# Patient Record
Sex: FEMALE | Race: OTHER | NOT HISPANIC OR LATINO
[De-identification: names, ages, dates, MRNs, and addresses within clinical notes are randomized per-mention and may not be internally consistent; named-entity substitution may affect disease eponyms.]

---

## 2017-02-28 ENCOUNTER — FORM ENCOUNTER (OUTPATIENT)
Age: 57
End: 2017-02-28

## 2017-03-01 ENCOUNTER — OUTPATIENT (OUTPATIENT)
Dept: OUTPATIENT SERVICES | Facility: HOSPITAL | Age: 57
LOS: 1 days | End: 2017-03-01
Payer: MEDICARE

## 2017-03-01 ENCOUNTER — APPOINTMENT (OUTPATIENT)
Dept: ORTHOPEDIC SURGERY | Facility: CLINIC | Age: 57
End: 2017-03-01

## 2017-03-01 VITALS
HEIGHT: 68 IN | SYSTOLIC BLOOD PRESSURE: 120 MMHG | BODY MASS INDEX: 37.13 KG/M2 | DIASTOLIC BLOOD PRESSURE: 80 MMHG | WEIGHT: 245 LBS

## 2017-03-01 DIAGNOSIS — M75.101 UNSPECIFIED ROTATOR CUFF TEAR OR RUPTURE OF RIGHT SHOULDER, NOT SPECIFIED AS TRAUMATIC: ICD-10-CM

## 2017-03-01 PROCEDURE — 73030 X-RAY EXAM OF SHOULDER: CPT

## 2017-03-01 PROCEDURE — 73030 X-RAY EXAM OF SHOULDER: CPT | Mod: 26,RT

## 2017-03-08 ENCOUNTER — APPOINTMENT (OUTPATIENT)
Dept: ORTHOPEDIC SURGERY | Facility: CLINIC | Age: 57
End: 2017-03-08

## 2017-03-29 ENCOUNTER — APPOINTMENT (OUTPATIENT)
Dept: ORTHOPEDIC SURGERY | Facility: CLINIC | Age: 57
End: 2017-03-29

## 2017-03-29 ENCOUNTER — OUTPATIENT (OUTPATIENT)
Dept: OUTPATIENT SERVICES | Facility: HOSPITAL | Age: 57
LOS: 1 days | End: 2017-03-29
Payer: MEDICARE

## 2017-03-29 VITALS
HEIGHT: 68 IN | SYSTOLIC BLOOD PRESSURE: 110 MMHG | WEIGHT: 250 LBS | BODY MASS INDEX: 37.89 KG/M2 | DIASTOLIC BLOOD PRESSURE: 86 MMHG

## 2017-03-29 DIAGNOSIS — M54.16 RADICULOPATHY, LUMBAR REGION: ICD-10-CM

## 2017-03-29 PROCEDURE — 72050 X-RAY EXAM NECK SPINE 4/5VWS: CPT | Mod: 26

## 2017-03-29 PROCEDURE — 72050 X-RAY EXAM NECK SPINE 4/5VWS: CPT

## 2017-04-03 ENCOUNTER — APPOINTMENT (OUTPATIENT)
Dept: ORTHOPEDIC SURGERY | Facility: CLINIC | Age: 57
End: 2017-04-03

## 2017-04-03 DIAGNOSIS — M75.51 BURSITIS OF RIGHT SHOULDER: ICD-10-CM

## 2017-04-03 DIAGNOSIS — M19.011 PRIMARY OSTEOARTHRITIS, RIGHT SHOULDER: ICD-10-CM

## 2017-04-03 DIAGNOSIS — M75.81 OTHER SHOULDER LESIONS, RIGHT SHOULDER: ICD-10-CM

## 2017-04-11 ENCOUNTER — APPOINTMENT (OUTPATIENT)
Dept: ORTHOPEDIC SURGERY | Facility: CLINIC | Age: 57
End: 2017-04-11

## 2017-04-11 DIAGNOSIS — G56.21 LESION OF ULNAR NERVE, RIGHT UPPER LIMB: ICD-10-CM

## 2017-04-28 PROBLEM — M19.011 OSTEOARTHRITIS OF RIGHT GLENOHUMERAL JOINT: Status: ACTIVE | Noted: 2017-04-28

## 2017-06-13 ENCOUNTER — OUTPATIENT (OUTPATIENT)
Dept: OUTPATIENT SERVICES | Facility: HOSPITAL | Age: 57
LOS: 1 days | End: 2017-06-13
Payer: MEDICARE

## 2017-06-13 PROCEDURE — 73564 X-RAY EXAM KNEE 4 OR MORE: CPT | Mod: 26,50

## 2017-06-13 PROCEDURE — 73564 X-RAY EXAM KNEE 4 OR MORE: CPT

## 2017-06-13 PROCEDURE — 73521 X-RAY EXAM HIPS BI 2 VIEWS: CPT

## 2017-06-13 PROCEDURE — 73521 X-RAY EXAM HIPS BI 2 VIEWS: CPT | Mod: 26

## 2017-06-23 ENCOUNTER — APPOINTMENT (OUTPATIENT)
Dept: OPHTHALMOLOGY | Facility: CLINIC | Age: 57
End: 2017-06-23

## 2017-09-28 ENCOUNTER — APPOINTMENT (OUTPATIENT)
Dept: OPHTHALMOLOGY | Facility: CLINIC | Age: 57
End: 2017-09-28
Payer: MEDICARE

## 2017-09-28 DIAGNOSIS — H40.003 PREGLAUCOMA, UNSPECIFIED, BILATERAL: ICD-10-CM

## 2017-09-28 PROCEDURE — 92020 GONIOSCOPY: CPT

## 2017-09-28 PROCEDURE — 99203 OFFICE O/P NEW LOW 30 MIN: CPT

## 2018-01-08 ENCOUNTER — APPOINTMENT (OUTPATIENT)
Dept: OPHTHALMOLOGY | Facility: CLINIC | Age: 58
End: 2018-01-08
Payer: MEDICARE

## 2018-01-08 ENCOUNTER — OUTPATIENT (OUTPATIENT)
Dept: OUTPATIENT SERVICES | Facility: HOSPITAL | Age: 58
LOS: 1 days | End: 2018-01-08

## 2018-01-08 ENCOUNTER — APPOINTMENT (OUTPATIENT)
Dept: OPHTHALMOLOGY | Facility: CLINIC | Age: 58
End: 2018-01-08

## 2018-01-08 PROCEDURE — 66761 REVISION OF IRIS: CPT | Mod: LT

## 2018-01-09 DIAGNOSIS — H40.2230 CHRONIC ANGLE-CLOSURE GLAUCOMA, BILATERAL, STAGE UNSPECIFIED: ICD-10-CM

## 2018-01-22 ENCOUNTER — OUTPATIENT (OUTPATIENT)
Dept: OUTPATIENT SERVICES | Facility: HOSPITAL | Age: 58
LOS: 1 days | End: 2018-01-22

## 2018-01-22 ENCOUNTER — APPOINTMENT (OUTPATIENT)
Dept: OPHTHALMOLOGY | Facility: CLINIC | Age: 58
End: 2018-01-22
Payer: MEDICARE

## 2018-01-22 PROCEDURE — 66761 REVISION OF IRIS: CPT | Mod: RT

## 2018-01-23 DIAGNOSIS — H40.2230 CHRONIC ANGLE-CLOSURE GLAUCOMA, BILATERAL, STAGE UNSPECIFIED: ICD-10-CM

## 2018-01-25 ENCOUNTER — APPOINTMENT (OUTPATIENT)
Dept: OPHTHALMOLOGY | Facility: CLINIC | Age: 58
End: 2018-01-25
Payer: MEDICARE

## 2018-01-25 PROCEDURE — 99024 POSTOP FOLLOW-UP VISIT: CPT

## 2018-02-15 ENCOUNTER — APPOINTMENT (OUTPATIENT)
Dept: OPHTHALMOLOGY | Facility: CLINIC | Age: 58
End: 2018-02-15
Payer: MEDICARE

## 2018-02-15 PROCEDURE — 99212 OFFICE O/P EST SF 10 MIN: CPT

## 2018-02-15 PROCEDURE — 92020 GONIOSCOPY: CPT

## 2018-04-13 ENCOUNTER — APPOINTMENT (OUTPATIENT)
Dept: OPHTHALMOLOGY | Facility: CLINIC | Age: 58
End: 2018-04-13
Payer: MEDICARE

## 2018-04-13 PROCEDURE — 92133 CPTRZD OPH DX IMG PST SGM ON: CPT

## 2018-04-13 PROCEDURE — 92083 EXTENDED VISUAL FIELD XM: CPT

## 2018-04-13 PROCEDURE — 92014 COMPRE OPH EXAM EST PT 1/>: CPT

## 2018-05-31 ENCOUNTER — RX RENEWAL (OUTPATIENT)
Age: 58
End: 2018-05-31

## 2018-07-31 ENCOUNTER — RX RENEWAL (OUTPATIENT)
Age: 58
End: 2018-07-31

## 2018-07-31 RX ORDER — TIMOLOL MALEATE 2.5 MG/ML
0.25 SOLUTION/ DROPS OPHTHALMIC
Qty: 1 | Refills: 3 | Status: ACTIVE | COMMUNITY
Start: 2018-07-31 | End: 1900-01-01

## 2018-08-16 ENCOUNTER — APPOINTMENT (OUTPATIENT)
Dept: OPHTHALMOLOGY | Facility: CLINIC | Age: 58
End: 2018-08-16
Payer: MEDICARE

## 2018-08-16 PROCEDURE — 92014 COMPRE OPH EXAM EST PT 1/>: CPT

## 2018-12-13 ENCOUNTER — APPOINTMENT (OUTPATIENT)
Dept: OPHTHALMOLOGY | Facility: CLINIC | Age: 58
End: 2018-12-13

## 2019-01-18 ENCOUNTER — APPOINTMENT (OUTPATIENT)
Dept: OPHTHALMOLOGY | Facility: CLINIC | Age: 59
End: 2019-01-18
Payer: MEDICARE

## 2019-01-18 PROCEDURE — 92014 COMPRE OPH EXAM EST PT 1/>: CPT

## 2019-01-22 ENCOUNTER — APPOINTMENT (OUTPATIENT)
Dept: ORTHOPEDIC SURGERY | Facility: CLINIC | Age: 59
End: 2019-01-22
Payer: MEDICARE

## 2019-01-22 VITALS — WEIGHT: 228 LBS | HEIGHT: 68 IN | BODY MASS INDEX: 34.56 KG/M2

## 2019-01-22 DIAGNOSIS — M67.912 UNSPECIFIED DISORDER OF SYNOVIUM AND TENDON, LEFT SHOULDER: ICD-10-CM

## 2019-01-22 DIAGNOSIS — M16.10 UNILATERAL PRIMARY OSTEOARTHRITIS, UNSPECIFIED HIP: ICD-10-CM

## 2019-01-22 DIAGNOSIS — G89.29 PAIN IN RIGHT WRIST: ICD-10-CM

## 2019-01-22 DIAGNOSIS — M25.531 PAIN IN RIGHT WRIST: ICD-10-CM

## 2019-01-22 DIAGNOSIS — Z56.0 UNEMPLOYMENT, UNSPECIFIED: ICD-10-CM

## 2019-01-22 DIAGNOSIS — Z87.39 PERSONAL HISTORY OF OTHER DISEASES OF THE MUSCULOSKELETAL SYSTEM AND CONNECTIVE TISSUE: ICD-10-CM

## 2019-01-22 DIAGNOSIS — Z82.62 FAMILY HISTORY OF OSTEOPOROSIS: ICD-10-CM

## 2019-01-22 DIAGNOSIS — M17.10 UNILATERAL PRIMARY OSTEOARTHRITIS, UNSPECIFIED KNEE: ICD-10-CM

## 2019-01-22 DIAGNOSIS — Z86.79 PERSONAL HISTORY OF OTHER DISEASES OF THE CIRCULATORY SYSTEM: ICD-10-CM

## 2019-01-22 DIAGNOSIS — Z80.8 FAMILY HISTORY OF MALIGNANT NEOPLASM OF OTHER ORGANS OR SYSTEMS: ICD-10-CM

## 2019-01-22 DIAGNOSIS — Z86.718 PERSONAL HISTORY OF OTHER VENOUS THROMBOSIS AND EMBOLISM: ICD-10-CM

## 2019-01-22 DIAGNOSIS — Z78.9 OTHER SPECIFIED HEALTH STATUS: ICD-10-CM

## 2019-01-22 DIAGNOSIS — M65.4 RADIAL STYLOID TENOSYNOVITIS [DE QUERVAIN]: ICD-10-CM

## 2019-01-22 PROCEDURE — 99215 OFFICE O/P EST HI 40 MIN: CPT | Mod: 25

## 2019-01-22 PROCEDURE — 20610 DRAIN/INJ JOINT/BURSA W/O US: CPT | Mod: LT

## 2019-01-22 PROCEDURE — 73100 X-RAY EXAM OF WRIST: CPT | Mod: RT

## 2019-01-22 PROCEDURE — 73030 X-RAY EXAM OF SHOULDER: CPT | Mod: LT

## 2019-01-22 SDOH — ECONOMIC STABILITY - INCOME SECURITY: UNEMPLOYMENT, UNSPECIFIED: Z56.0

## 2019-01-22 NOTE — PROCEDURE
[Injection] : Injection [Left] : of the left [Subacromial Bursa] : subacromial bursa [Inflammation] : inflammation [Joint Pain] : joint pain [Bleeding] : bleeding [Infection] : infection [Allergic Reaction] : allergic reaction [Patient] : patient [Risk] : risk [Benefits] : benefits [Alternatives] : alternatives [Verbal Consent Obtained] : verbal consent was obtained prior to the procedure [Alcohol] : alcohol [Betadine] : betadine [Ethyl Chloride Spray] : ethyl chloride spray was used as a topical anesthetic [Posterior] : posterior [Same Needle As Aspiration/New Syringe] : the syringe was changed and the same needle was left in place and [1% Lidocaine___(mL)] : [unfilled] mL of 1% Lidocaine [Methylpred. 40mg/mL___(mL)] : [unfilled] mL 40mg/mL methylprednisolone [Bandage Applied] : a bandage [Tolerated Well] : The patient tolerated the procedure well [None] : none [No Strenuous Activity___day(s)] : avoid strenuous activity for [unfilled] day(s) [___ Week(s)] : in [unfilled] week(s)

## 2019-01-22 NOTE — REVIEW OF SYSTEMS
[Joint Pain] : joint pain [Negative] : Heme/Lymph [Joint Stiffness] : joint stiffness [Eye Pain] : eye pain

## 2019-01-29 ENCOUNTER — RX RENEWAL (OUTPATIENT)
Age: 59
End: 2019-01-29

## 2019-01-29 DIAGNOSIS — F40.240 CLAUSTROPHOBIA: ICD-10-CM

## 2019-02-04 PROBLEM — M54.12 CERVICAL RADICULOPATHY: Status: ACTIVE | Noted: 2017-03-29

## 2019-02-05 ENCOUNTER — APPOINTMENT (OUTPATIENT)
Dept: ORTHOPEDIC SURGERY | Facility: CLINIC | Age: 59
End: 2019-02-05
Payer: MEDICARE

## 2019-02-05 DIAGNOSIS — M54.12 RADICULOPATHY, CERVICAL REGION: ICD-10-CM

## 2019-02-05 PROCEDURE — 99214 OFFICE O/P EST MOD 30 MIN: CPT | Mod: 25

## 2019-02-05 PROCEDURE — 20552 NJX 1/MLT TRIGGER POINT 1/2: CPT | Mod: RT

## 2019-02-05 NOTE — ASSESSMENT
[FreeTextEntry1] : 57 yo with Left shoulder osteoarthritis and cervical spondylosis\par  . There is keep pain and inflammation in the LEFT shoulder. MRI showed more arthritis than appreciated by x-ray and there is also signs of inflammation. I referred her for some blood tests to rule out rheumatoid arthritis or other inflammatory arthritis.\par She was having significant pain in the RIGHT rhomboid so we did a trigger point injection there today which seemed to give her some immediate relief from the lidocaine. Hopefully it will give her more long-term relief. I prescribed an MRI of the cervical spine because she is clearly having significant pain from the cervical spine and seemed to have some neurologic symptoms in the LEFT hand which could be cervical radiculopathy. The shoulder pain may be in part related referred from the cervical spine but based on exam I believe is also significantly from the shoulder itself.\par I will call her with the results of the blood tests and the MRI and see how she is feeling. She'll start physical therapy. Continue meloxicam and muscle relaxants and lidocaine patch and Tylenol as needed

## 2019-02-05 NOTE — HISTORY OF PRESENT ILLNESS
[de-identified] : Left shoulder is still very painful. \par The cortisone injection last visit helped for about 3 days and then the pain came back. She is having a lot of pain lifting her LEFT arm.\par Pain today is more severe around the RIGHT mid medial scapular region. She does have pain with neck range of motion.\par She was referred to physical therapy but hasn't started yet.\par She is using a lidocaine patch and she has been taking the Mobic 15 mg q.d. She was also using a muscle relaxants and would like to try more of that.\par She has pain both at rest but is intense in the scapular region and the shoulder pain is worse with movement. She is also getting numbness in the ulnar fingers

## 2019-02-06 LAB
BASOPHILS # BLD AUTO: 0.03 K/UL
BASOPHILS NFR BLD AUTO: 0.3 %
EOSINOPHIL # BLD AUTO: 0.28 K/UL
EOSINOPHIL NFR BLD AUTO: 2.6 %
ERYTHROCYTE [SEDIMENTATION RATE] IN BLOOD BY WESTERGREN METHOD: 9 MM/HR
HCT VFR BLD CALC: 42.3 %
HGB BLD-MCNC: 13.3 G/DL
IMM GRANULOCYTES NFR BLD AUTO: 0.2 %
LYMPHOCYTES # BLD AUTO: 3.04 K/UL
LYMPHOCYTES NFR BLD AUTO: 28.2 %
MAN DIFF?: NORMAL
MCHC RBC-ENTMCNC: 31.4 GM/DL
MCHC RBC-ENTMCNC: 31.6 PG
MCV RBC AUTO: 100.5 FL
MONOCYTES # BLD AUTO: 0.5 K/UL
MONOCYTES NFR BLD AUTO: 4.6 %
NEUTROPHILS # BLD AUTO: 6.92 K/UL
NEUTROPHILS NFR BLD AUTO: 64.1 %
PLATELET # BLD AUTO: 273 K/UL
RBC # BLD: 4.21 M/UL
RBC # FLD: 13.5 %
WBC # FLD AUTO: 10.79 K/UL

## 2019-02-07 LAB
ANA SER IF-ACNC: NEGATIVE
CCP AB SER IA-ACNC: <8 UNITS
RF+CCP IGG SER-IMP: NEGATIVE
RHEUMATOID FACT SER QL: <10 IU/ML

## 2019-02-12 LAB

## 2019-02-13 LAB — CRP SERPL-MCNC: 1.06 MG/DL

## 2019-02-18 PROBLEM — M75.02 ADHESIVE CAPSULITIS OF LEFT SHOULDER: Status: ACTIVE | Noted: 2019-01-22

## 2019-02-19 ENCOUNTER — APPOINTMENT (OUTPATIENT)
Dept: ORTHOPEDIC SURGERY | Facility: CLINIC | Age: 59
End: 2019-02-19
Payer: MEDICARE

## 2019-02-19 DIAGNOSIS — M75.02 ADHESIVE CAPSULITIS OF LEFT SHOULDER: ICD-10-CM

## 2019-02-19 DIAGNOSIS — M75.42 IMPINGEMENT SYNDROME OF LEFT SHOULDER: ICD-10-CM

## 2019-02-19 PROCEDURE — 99214 OFFICE O/P EST MOD 30 MIN: CPT

## 2019-02-19 NOTE — PHYSICAL EXAM
[UE] : Sensory: Intact in bilateral upper extremities [Rad] : radial 2+ and symmetric bilaterally [Normal RUE] : Right Upper Extremity: No scars, rashes, lesions, ulcers, skin intact [Normal LUE] : Left Upper Extremity: No scars, rashes, lesions, ulcers, skin intact [Normal Touch] : sensation intact for touch [de-identified] : Shoulders, cervical spine and upper back:\par No edema, ecchymoses, erythema.\par Cervical spine range of motion is with stiffness and minimal discomfort but no particular orthopedic.\par Tender in the RIGHT rhomboid/periscapular muscles adjacent to the RIGHT thoracic spine and less tender on the LEFT.\par Tender glenohumeral joint LEFT. No appreciable effusion. No warmth.\par LEFT shoulder range of motion is with severe pain and stiffness. On the RIGHT side she can elevate her arm to 175° but on the LEFT she can only elevate to about 30 or 40° with pain. Passively I can elevate the shoulder to about 90° after which there is severe pain. External rotation is to about 65° on the LEFT.\par Good strength with internal and external rotation. Pain and weakness LEFT with elevation of the arm [de-identified] : \par No new studies.\par

## 2019-02-19 NOTE — REASON FOR VISIT
[Follow-Up Visit] : a follow-up visit for [Shoulder Pain] : shoulder pain [FreeTextEntry2] : right mid back and neck pain

## 2019-02-19 NOTE — ASSESSMENT
[FreeTextEntry1] : 57 yo with ongoing LEFT shoulder pain and stiffness where she has underlying osteoarthritis and rotator cuff tendinopathy and tendinitis. The blood test did not seem to indicate any significant rheumatoid arthritis but the C-reactive protein was slightly elevated. She also has cervical spondylosis and has radicular pain to the scapular region.Her pain is much more severe than I would expect given what we are seeing on the studies. Even with severe arthritis patients don't typically have this much pain. \par She will be seeing a neurologist in a few weeks.\par She is getting the MRI of the cervical spine as I had ordered. I will call her with the results.\par For the shoulder I did not palpate any significant effusion that I could aspirate but referred her for an ultrasound to see if there is fluid to aspirate send for crystals and culture. A corticosteroid injection could be done under ultrasound guidance as well to see if this gives her more relief than the last injection.\par Until she gets that done I suggested taking a steroid pack, prednisone over 6 days tapering from 60 mg to 10 mg to see if this gives her relief. She was very disappointed come in today because she felt like nothing was being done but I felt that we first need to try to figure out why her pain is as severe as it is in the shoulder. Once we have a clear diagnosis we can better direct treatment.  I felt the ultrasound-guided aspiration and injection would be more helpful  and more precise.\par I did prescribe Xanax for the MRI of her cervical spine but I would not prescribe chronic use. She would need to speak to her medical doctor or pain management specialist..\par From the ultrasound, MRI and neurology consultation hopefully we will have more information.\par Hopefully the prednisone gives her some relief.

## 2019-02-19 NOTE — HISTORY OF PRESENT ILLNESS
[de-identified] : Left shoulder is feeling persistently painful -- currently 8/10.  It is worse with movement. THere is tingling in the fingers LEFT greater than RIGHT hand. Some neck pain but less severe.\par She is scheduled to get MRI C spine next week and is seeing Dr Stokes from neurology 3/15.\par When she had the MRI and took the Xanax she had the most relief of pain and would like more Xanax.\par She is taking Flexeril which doesn't help, Aleve 4 /day and Tylenol #3 and flexeril patches\par She hasn't started  PT yet. No fevers or chills. The corticosteroid injection in the LEFT shoulder performed about a month ago did not help.\par She also has more pain now in the RIGHT  mid back thoracic region.

## 2019-03-04 ENCOUNTER — RX RENEWAL (OUTPATIENT)
Age: 59
End: 2019-03-04

## 2019-03-11 ENCOUNTER — APPOINTMENT (OUTPATIENT)
Dept: NEUROLOGY | Facility: CLINIC | Age: 59
End: 2019-03-11
Payer: MEDICARE

## 2019-03-11 VITALS
HEART RATE: 77 BPM | BODY MASS INDEX: 33.49 KG/M2 | OXYGEN SATURATION: 98 % | WEIGHT: 221 LBS | SYSTOLIC BLOOD PRESSURE: 114 MMHG | HEIGHT: 68 IN | TEMPERATURE: 97.9 F | DIASTOLIC BLOOD PRESSURE: 85 MMHG

## 2019-03-11 DIAGNOSIS — M47.812 SPONDYLOSIS W/OUT MYELOPATHY OR RADICULOPATHY, CERVICAL REGION: ICD-10-CM

## 2019-03-11 DIAGNOSIS — M79.18 MYALGIA, OTHER SITE: ICD-10-CM

## 2019-03-11 DIAGNOSIS — M19.012 PRIMARY OSTEOARTHRITIS, LEFT SHOULDER: ICD-10-CM

## 2019-03-11 PROCEDURE — 99204 OFFICE O/P NEW MOD 45 MIN: CPT

## 2019-03-11 NOTE — CONSULT LETTER
[Dear  ___] : Dear  [unfilled], [Consult Letter:] : I had the pleasure of evaluating your patient, [unfilled]. [Please see my note below.] : Please see my note below. [Consult Closing:] : Thank you very much for allowing me to participate in the care of this patient.  If you have any questions, please do not hesitate to contact me. [Sincerely,] : Sincerely, [FreeTextEntry3] : Den Stokes M.D.\par Neurology, Electromyography and Neuromuscular Medicine\par [facility]\par \par  of Neurology\par [facility] [employer]

## 2019-03-11 NOTE — ASSESSMENT
[FreeTextEntry1] : Unlikely neurologic cause of symptoms. Exam is unremarkable - strength and reflexes symmetric despite nonspecific sensory symptoms, given that NCS/EMG is low yield and i would not recommend at this time. \par Return as needed.
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
## 2019-03-11 NOTE — PHYSICAL EXAM
[FreeTextEntry1] : Gen: appears well, well-nourished, no acute distress\par MS: awake, alert, speech fluent, comprehension intact, follows commands\par CN: PERRL, EOMI, visual fields full, facial strength and sensation intact and symmetric, palate elevation symmetric, tongue midline, no tongue atrophy or fasciculations\par Motor: normal bulk and tone, 5/5 strength throughout, no abnormal movements\par Sensory: reports diminished vibration and PP left hand compared to right\par Reflexes: 1+ UE absent LE b/l, no Orozco’s sign\par Coordination: no dysmetria on finger to nose\par Gait: antalgic\par Skin: no rash on extremities; scars on knees b/l\par Ext: no edema\par CV: regular pulse\par Ophtho: fundi not visualized\par MSK: severe tenderness right rhomboid and left trapezius, shoulder girdle, supraclavicular area\par

## 2019-03-13 ENCOUNTER — CLINICAL ADVICE (OUTPATIENT)
Age: 59
End: 2019-03-13

## 2019-04-01 ENCOUNTER — CLINICAL ADVICE (OUTPATIENT)
Age: 59
End: 2019-04-01

## 2019-04-01 RX ORDER — PREDNISONE 10 MG/1
10 TABLET ORAL
Qty: 21 | Refills: 0 | Status: COMPLETED | COMMUNITY
Start: 2019-02-19 | End: 2019-03-07

## 2019-04-08 ENCOUNTER — RX RENEWAL (OUTPATIENT)
Age: 59
End: 2019-04-08

## 2019-04-08 RX ORDER — CYCLOBENZAPRINE HYDROCHLORIDE 5 MG/1
5 TABLET, FILM COATED ORAL
Qty: 30 | Refills: 0 | Status: ACTIVE | COMMUNITY
Start: 2019-02-05 | End: 1900-01-01

## 2019-04-08 RX ORDER — ACETAMINOPHEN AND CODEINE PHOSPHATE 60; 300 MG/1; MG/1
300-60 TABLET ORAL
Qty: 120 | Refills: 0 | Status: COMPLETED | COMMUNITY
Start: 2016-07-20 | End: 2019-04-08

## 2019-04-08 RX ORDER — MELOXICAM 15 MG/1
TABLET ORAL
Refills: 0 | Status: COMPLETED | COMMUNITY
End: 2019-04-08

## 2019-04-08 RX ORDER — ALPRAZOLAM 0.5 MG/1
0.5 TABLET ORAL
Qty: 2 | Refills: 0 | Status: COMPLETED | COMMUNITY
Start: 2019-01-29 | End: 2019-04-08

## 2019-04-08 NOTE — ASSESSMENT
[FreeTextEntry1] : 58-year-old woman who has a very complicated history with a lot going on.\par She comes in primarily for her severe LEFT shoulder and neck pain. She has cervical spondylosis and stenosis and radicular symptoms but also is having LEFT shoulder pain and limited motion.\par I referred her for an MRI of the LEFT shoulder.\par 2 try to give her some immediate pain relief I did do a subacromial corticosteroid injection. She may continue taking meloxicam and Tylenol. She can take Tylenol with codeine also as needed but I would try to minimize narcotics.\par She was referred to physical therapy for the shoulder.\par I will call her with the MRI results. She may have gone into an adhesive capsulitis given the significant loss of motion and pain. She does have some osteoarthritis but it seemed like the stiffness is more than I would have expected given the x-ray findings.\par She also is having significant numbness in both upper extremities worse on the LEFT than RIGHT that was aggravated by a posture brace that she was wearing recently. I referred her for nerve studies to try to determine where the numbness is coming from whether it cervical, cubital tunnel or other nerve impingement.\par She has RIGHT radial wrist pain consistent with de Quervain's tenosynovitis. I recommended a thumb spica splint, heat and ice and NSAIDs.\par Followup in about 3-4 weeks.
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
## 2019-04-08 NOTE — PHYSICAL EXAM
[UE] : Sensory: Intact in bilateral upper extremities [Rad] : radial 2+ and symmetric bilaterally [Normal RUE] : Right Upper Extremity: No scars, rashes, lesions, ulcers, skin intact [Normal LUE] : Left Upper Extremity: No scars, rashes, lesions, ulcers, skin intact [Normal Touch] : sensation intact for touch [Obese] : obese [Normal] : Oriented to person, place, and time, insight and judgement were intact and the affect was normal [de-identified] : Bilateral shouldesr:\par Cervical spine is mildly tender. Cervical ROM is within normal limits and without pain.\par Normal appearance. \par LEFT shoulder range of motion actively is with forward elevation to about 90° and passively to about 110° with severe pain.\par External rotation to about 40° and internal rotation to L5.\par In the RIGHT shoulder active motion is to about 145° and passively to about 165° with pain.\par 45° external rotation with the arm at the side and internal rotation to T12.\par Motor:  4/5  supraspinatus,  5-/5 ER, 5-/5 IR, 5/5 biceps, 5/5 deltoid. -\par L>R: + Neer, + Sanchez. + X-arm.   +[place]'s, + Speeds.\par Tender over the biceps tendon and the anterior shoulder.  \par Laxity: normal.\par No scapular winging.\par Sensation is intact distally in the UE Except decreased in the ulnar distribution distally in the index and small fingers and bilateral hands.\par Skin is intact in the UE. \par Intact Motor distally.\par \par RIGHT wrist:\par No edema, ecchymoses, erythema.\par Tender over the first extensor compartment happened just distal to the tip of the radial styloid.\par A positive Finkelstein's.\par Intact thumb extension and abduction.\par Intact wrist range of motion without any significant limitation or pain.\par Motor is  intact.No muscle atrophy. Decreased sensation as described above ulnar digits  [de-identified] : No respiratory distress [de-identified] : \par X-rays of the RIGHT wrist AP and lateral views show no significant osteoarthritis or abnormalities.\par X-rays of the LEFT shoulder 3 views show irregularity of the humeral head with mild glenohumeral joint space narrowing but no elevation of the head. No calcifications or bone lesions.

## 2019-04-16 ENCOUNTER — MESSAGE (OUTPATIENT)
Age: 59
End: 2019-04-16

## 2019-04-17 ENCOUNTER — APPOINTMENT (OUTPATIENT)
Dept: ORTHOPEDIC SURGERY | Facility: CLINIC | Age: 59
End: 2019-04-17

## 2019-05-17 ENCOUNTER — APPOINTMENT (OUTPATIENT)
Dept: OPHTHALMOLOGY | Facility: CLINIC | Age: 59
End: 2019-05-17

## 2019-08-09 ENCOUNTER — NON-APPOINTMENT (OUTPATIENT)
Age: 59
End: 2019-08-09

## 2019-08-09 ENCOUNTER — APPOINTMENT (OUTPATIENT)
Dept: OPHTHALMOLOGY | Facility: CLINIC | Age: 59
End: 2019-08-09
Payer: MEDICARE

## 2019-08-09 PROCEDURE — 92012 INTRM OPH EXAM EST PATIENT: CPT

## 2019-08-20 ENCOUNTER — RX RENEWAL (OUTPATIENT)
Age: 59
End: 2019-08-20

## 2019-09-02 ENCOUNTER — RX RENEWAL (OUTPATIENT)
Age: 59
End: 2019-09-02

## 2020-01-23 ENCOUNTER — NON-APPOINTMENT (OUTPATIENT)
Age: 60
End: 2020-01-23

## 2020-01-23 ENCOUNTER — APPOINTMENT (OUTPATIENT)
Dept: OPHTHALMOLOGY | Facility: CLINIC | Age: 60
End: 2020-01-23
Payer: MEDICARE

## 2020-01-23 PROCEDURE — 92014 COMPRE OPH EXAM EST PT 1/>: CPT

## 2020-06-29 ENCOUNTER — APPOINTMENT (OUTPATIENT)
Dept: ENDOCRINOLOGY | Facility: CLINIC | Age: 60
End: 2020-06-29
Payer: MEDICARE

## 2020-06-29 VITALS
WEIGHT: 222 LBS | HEART RATE: 60 BPM | DIASTOLIC BLOOD PRESSURE: 90 MMHG | BODY MASS INDEX: 33.65 KG/M2 | HEIGHT: 68 IN | SYSTOLIC BLOOD PRESSURE: 151 MMHG

## 2020-06-29 DIAGNOSIS — Z00.00 ENCOUNTER FOR GENERAL ADULT MEDICAL EXAMINATION W/OUT ABNORMAL FINDINGS: ICD-10-CM

## 2020-06-29 PROCEDURE — 36415 COLL VENOUS BLD VENIPUNCTURE: CPT

## 2020-06-29 PROCEDURE — 99205 OFFICE O/P NEW HI 60 MIN: CPT | Mod: 25

## 2020-06-30 LAB
24R-OH-CALCIDIOL SERPL-MCNC: 25.8 PG/ML
25(OH)D3 SERPL-MCNC: 43.8 NG/ML
ALBUMIN SERPL ELPH-MCNC: 4.5 G/DL
CALCIUM SERPL-MCNC: 9 MG/DL
CALCIUM SERPL-MCNC: 9 MG/DL
ESTIMATED AVERAGE GLUCOSE: 108 MG/DL
HBA1C MFR BLD HPLC: 5.4 %
MAGNESIUM SERPL-MCNC: 1.7 MG/DL
PARATHYROID HORMONE INTACT: 10 PG/ML
PHOSPHATE SERPL-MCNC: 4.2 MG/DL
TSH SERPL-ACNC: 0.58 UIU/ML

## 2020-06-30 RX ORDER — CALCITRIOL 0.5 UG/1
0.5 CAPSULE, LIQUID FILLED ORAL
Refills: 0 | Status: COMPLETED | COMMUNITY
End: 2020-06-30

## 2020-06-30 RX ORDER — LEVOTHYROXINE SODIUM 0.17 MG/1
175 TABLET ORAL
Qty: 30 | Refills: 0 | Status: COMPLETED | COMMUNITY
Start: 2016-10-31 | End: 2020-06-30

## 2020-07-16 ENCOUNTER — APPOINTMENT (OUTPATIENT)
Dept: OPHTHALMOLOGY | Facility: CLINIC | Age: 60
End: 2020-07-16

## 2020-08-06 ENCOUNTER — APPOINTMENT (OUTPATIENT)
Dept: OPHTHALMOLOGY | Facility: CLINIC | Age: 60
End: 2020-08-06
Payer: MEDICARE

## 2020-08-06 ENCOUNTER — NON-APPOINTMENT (OUTPATIENT)
Age: 60
End: 2020-08-06

## 2020-08-06 PROCEDURE — 92014 COMPRE OPH EXAM EST PT 1/>: CPT

## 2020-08-11 ENCOUNTER — APPOINTMENT (OUTPATIENT)
Dept: ENDOCRINOLOGY | Facility: CLINIC | Age: 60
End: 2020-08-11

## 2020-09-02 ENCOUNTER — APPOINTMENT (OUTPATIENT)
Dept: ENDOCRINOLOGY | Facility: CLINIC | Age: 60
End: 2020-09-02
Payer: MEDICARE

## 2020-09-02 VITALS
BODY MASS INDEX: 34.21 KG/M2 | WEIGHT: 225 LBS | HEART RATE: 57 BPM | DIASTOLIC BLOOD PRESSURE: 76 MMHG | SYSTOLIC BLOOD PRESSURE: 131 MMHG

## 2020-09-02 DIAGNOSIS — R13.10 DYSPHAGIA, UNSPECIFIED: ICD-10-CM

## 2020-09-02 PROCEDURE — 99215 OFFICE O/P EST HI 40 MIN: CPT

## 2020-09-02 RX ORDER — MELOXICAM 15 MG/1
15 TABLET ORAL DAILY
Qty: 14 | Refills: 0 | Status: COMPLETED | COMMUNITY
Start: 2019-04-01 | End: 2020-09-02

## 2020-09-03 LAB
25(OH)D3 SERPL-MCNC: 50.6 NG/ML
ALBUMIN SERPL ELPH-MCNC: 4.5 G/DL
CA-I SERPL-SCNC: 1.2 MMOL/L
CALCIUM SERPL-MCNC: 9.6 MG/DL
PHOSPHATE SERPL-MCNC: 4.2 MG/DL
TSH SERPL-ACNC: 0.8 UIU/ML

## 2020-09-15 ENCOUNTER — APPOINTMENT (OUTPATIENT)
Dept: ENDOCRINOLOGY | Facility: CLINIC | Age: 60
End: 2020-09-15

## 2020-09-25 ENCOUNTER — APPOINTMENT (OUTPATIENT)
Dept: ENDOCRINOLOGY | Facility: CLINIC | Age: 60
End: 2020-09-25
Payer: MEDICARE

## 2020-09-25 VITALS — WEIGHT: 220 LBS | BODY MASS INDEX: 33.45 KG/M2

## 2020-09-25 PROCEDURE — 97802 MEDICAL NUTRITION INDIV IN: CPT

## 2020-09-30 ENCOUNTER — APPOINTMENT (OUTPATIENT)
Dept: ENDOCRINOLOGY | Facility: CLINIC | Age: 60
End: 2020-09-30
Payer: MEDICARE

## 2020-09-30 DIAGNOSIS — Z80.3 FAMILY HISTORY OF MALIGNANT NEOPLASM OF BREAST: ICD-10-CM

## 2020-09-30 DIAGNOSIS — R23.2 FLUSHING: ICD-10-CM

## 2020-09-30 PROCEDURE — 99443: CPT

## 2020-09-30 RX ORDER — PAROXETINE HYDROCHLORIDE 10 MG/1
10 TABLET, FILM COATED ORAL
Qty: 90 | Refills: 0 | Status: COMPLETED | COMMUNITY
Start: 2020-09-02 | End: 2020-09-30

## 2020-10-21 ENCOUNTER — RX RENEWAL (OUTPATIENT)
Age: 60
End: 2020-10-21

## 2021-01-11 ENCOUNTER — RX RENEWAL (OUTPATIENT)
Age: 61
End: 2021-01-11

## 2021-02-09 ENCOUNTER — NON-APPOINTMENT (OUTPATIENT)
Age: 61
End: 2021-02-09

## 2021-02-12 ENCOUNTER — APPOINTMENT (OUTPATIENT)
Dept: ENDOCRINOLOGY | Facility: CLINIC | Age: 61
End: 2021-02-12

## 2021-02-18 LAB
25(OH)D3 SERPL-MCNC: 52.9 NG/ML
ALBUMIN SERPL ELPH-MCNC: 4.6 G/DL
CALCIUM SERPL-MCNC: 9.2 MG/DL
CALCIUM SERPL-MCNC: 9.2 MG/DL
CREAT SERPL-MCNC: 0.8 MG/DL
MAGNESIUM SERPL-MCNC: 1.7 MG/DL
PARATHYROID HORMONE INTACT: 14 PG/ML
PHOSPHATE SERPL-MCNC: 4.4 MG/DL

## 2021-02-19 LAB
CA-I SERPL-SCNC: 1.15 MMOL/L
ESTIMATED AVERAGE GLUCOSE: 111 MG/DL
HBA1C MFR BLD HPLC: 5.5 %

## 2021-02-26 ENCOUNTER — APPOINTMENT (OUTPATIENT)
Dept: ENDOCRINOLOGY | Facility: CLINIC | Age: 61
End: 2021-02-26
Payer: MEDICARE

## 2021-02-26 PROCEDURE — 99442: CPT

## 2021-03-12 ENCOUNTER — APPOINTMENT (OUTPATIENT)
Dept: OPHTHALMOLOGY | Facility: CLINIC | Age: 61
End: 2021-03-12
Payer: MEDICARE

## 2021-03-12 ENCOUNTER — NON-APPOINTMENT (OUTPATIENT)
Age: 61
End: 2021-03-12

## 2021-03-12 PROCEDURE — 99072 ADDL SUPL MATRL&STAF TM PHE: CPT

## 2021-03-12 PROCEDURE — 92015 DETERMINE REFRACTIVE STATE: CPT

## 2021-03-12 PROCEDURE — 92020 GONIOSCOPY: CPT

## 2021-03-12 PROCEDURE — 92014 COMPRE OPH EXAM EST PT 1/>: CPT

## 2021-04-26 ENCOUNTER — NON-APPOINTMENT (OUTPATIENT)
Age: 61
End: 2021-04-26

## 2021-04-30 ENCOUNTER — RX RENEWAL (OUTPATIENT)
Age: 61
End: 2021-04-30

## 2021-04-30 ENCOUNTER — NON-APPOINTMENT (OUTPATIENT)
Age: 61
End: 2021-04-30

## 2021-04-30 RX ORDER — LEVOTHYROXINE SODIUM 0.12 MG/1
125 TABLET ORAL DAILY
Qty: 90 | Refills: 1 | Status: COMPLETED | COMMUNITY
Start: 2020-06-30 | End: 2021-04-30

## 2021-05-05 ENCOUNTER — NON-APPOINTMENT (OUTPATIENT)
Age: 61
End: 2021-05-05

## 2021-05-19 LAB
24R-OH-CALCIDIOL SERPL-MCNC: 28.8 PG/ML
25(OH)D3 SERPL-MCNC: 61.4 NG/ML
ALBUMIN SERPL ELPH-MCNC: 4.5 G/DL
CA-I SERPL-SCNC: 1.1 MMOL/L
CALCIUM SERPL-MCNC: 9 MG/DL

## 2021-06-14 ENCOUNTER — NON-APPOINTMENT (OUTPATIENT)
Age: 61
End: 2021-06-14

## 2021-06-22 ENCOUNTER — APPOINTMENT (OUTPATIENT)
Dept: ENDOCRINOLOGY | Facility: CLINIC | Age: 61
End: 2021-06-22
Payer: MEDICARE

## 2021-06-22 VITALS
HEART RATE: 76 BPM | WEIGHT: 222 LBS | SYSTOLIC BLOOD PRESSURE: 156 MMHG | HEIGHT: 68 IN | DIASTOLIC BLOOD PRESSURE: 88 MMHG | BODY MASS INDEX: 33.65 KG/M2

## 2021-06-22 DIAGNOSIS — Z13.1 ENCOUNTER FOR SCREENING FOR DIABETES MELLITUS: ICD-10-CM

## 2021-06-22 DIAGNOSIS — G25.2 OTHER SPECIFIED FORMS OF TREMOR: ICD-10-CM

## 2021-06-22 PROCEDURE — 99072 ADDL SUPL MATRL&STAF TM PHE: CPT

## 2021-06-22 PROCEDURE — 96372 THER/PROPH/DIAG INJ SC/IM: CPT

## 2021-06-22 PROCEDURE — 99215 OFFICE O/P EST HI 40 MIN: CPT | Mod: 25

## 2021-06-23 LAB
24R-OH-CALCIDIOL SERPL-MCNC: 20.8 PG/ML
25(OH)D3 SERPL-MCNC: 62.4 NG/ML
ALBUMIN SERPL ELPH-MCNC: 4.7 G/DL
CA-I SERPL-SCNC: 1.17 MMOL/L
CALCIUM SERPL-MCNC: 10 MG/DL
CREAT SERPL-MCNC: 0.88 MG/DL
ESTIMATED AVERAGE GLUCOSE: 108 MG/DL
HBA1C MFR BLD HPLC: 5.4 %
MAGNESIUM SERPL-MCNC: 1.7 MG/DL
PHOSPHATE SERPL-MCNC: 4.5 MG/DL
TSH SERPL-ACNC: 0.5 UIU/ML

## 2021-09-30 ENCOUNTER — RX RENEWAL (OUTPATIENT)
Age: 61
End: 2021-09-30

## 2021-10-06 ENCOUNTER — NON-APPOINTMENT (OUTPATIENT)
Age: 61
End: 2021-10-06

## 2021-10-06 ENCOUNTER — APPOINTMENT (OUTPATIENT)
Dept: ENDOCRINOLOGY | Facility: CLINIC | Age: 61
End: 2021-10-06
Payer: MEDICARE

## 2021-10-06 VITALS
WEIGHT: 204 LBS | SYSTOLIC BLOOD PRESSURE: 152 MMHG | BODY MASS INDEX: 31.02 KG/M2 | HEART RATE: 66 BPM | DIASTOLIC BLOOD PRESSURE: 90 MMHG

## 2021-10-06 PROCEDURE — 99215 OFFICE O/P EST HI 40 MIN: CPT

## 2021-10-07 LAB
25(OH)D3 SERPL-MCNC: 72.6 NG/ML
ALBUMIN SERPL ELPH-MCNC: 4.8 G/DL
ALP BLD-CCNC: 70 U/L
ALT SERPL-CCNC: 14 U/L
ANION GAP SERPL CALC-SCNC: 17 MMOL/L
AST SERPL-CCNC: 17 U/L
BILIRUB SERPL-MCNC: 0.3 MG/DL
BUN SERPL-MCNC: 12 MG/DL
CA-I SERPL-SCNC: 1.27 MMOL/L
CALCIUM SERPL-MCNC: 10.4 MG/DL
CHLORIDE SERPL-SCNC: 99 MMOL/L
CO2 SERPL-SCNC: 24 MMOL/L
CREAT SERPL-MCNC: 0.71 MG/DL
GLUCOSE SERPL-MCNC: 104 MG/DL
PHOSPHATE SERPL-MCNC: 4.1 MG/DL
POTASSIUM SERPL-SCNC: 4.9 MMOL/L
PROT SERPL-MCNC: 7.4 G/DL
SODIUM SERPL-SCNC: 141 MMOL/L
T4 FREE SERPL-MCNC: 1.4 NG/DL
TSH SERPL-ACNC: 0.85 UIU/ML

## 2021-12-03 ENCOUNTER — NON-APPOINTMENT (OUTPATIENT)
Age: 61
End: 2021-12-03

## 2021-12-03 ENCOUNTER — RX RENEWAL (OUTPATIENT)
Age: 61
End: 2021-12-03

## 2022-01-05 ENCOUNTER — APPOINTMENT (OUTPATIENT)
Dept: ENDOCRINOLOGY | Facility: CLINIC | Age: 62
End: 2022-01-05

## 2022-01-10 ENCOUNTER — RX RENEWAL (OUTPATIENT)
Age: 62
End: 2022-01-10

## 2022-01-12 ENCOUNTER — APPOINTMENT (OUTPATIENT)
Dept: ENDOCRINOLOGY | Facility: CLINIC | Age: 62
End: 2022-01-12
Payer: MEDICARE

## 2022-01-12 VITALS — BODY MASS INDEX: 29.95 KG/M2 | WEIGHT: 197 LBS

## 2022-01-12 PROCEDURE — 99214 OFFICE O/P EST MOD 30 MIN: CPT

## 2022-01-12 RX ORDER — SEMAGLUTIDE 1.34 MG/ML
2 INJECTION, SOLUTION SUBCUTANEOUS
Qty: 4.5 | Refills: 1 | Status: COMPLETED | COMMUNITY
Start: 2022-01-06 | End: 2022-01-12

## 2022-02-09 ENCOUNTER — LABORATORY RESULT (OUTPATIENT)
Age: 62
End: 2022-02-09

## 2022-02-09 ENCOUNTER — APPOINTMENT (OUTPATIENT)
Dept: ENDOCRINOLOGY | Facility: CLINIC | Age: 62
End: 2022-02-09
Payer: MEDICARE

## 2022-02-09 VITALS
DIASTOLIC BLOOD PRESSURE: 80 MMHG | HEIGHT: 68 IN | BODY MASS INDEX: 29.4 KG/M2 | HEART RATE: 78 BPM | WEIGHT: 194 LBS | SYSTOLIC BLOOD PRESSURE: 126 MMHG

## 2022-02-09 DIAGNOSIS — E66.9 OBESITY, UNSPECIFIED: ICD-10-CM

## 2022-02-09 PROCEDURE — 99214 OFFICE O/P EST MOD 30 MIN: CPT

## 2022-02-10 ENCOUNTER — NON-APPOINTMENT (OUTPATIENT)
Age: 62
End: 2022-02-10

## 2022-02-10 LAB
25(OH)D3 SERPL-MCNC: 77.3 NG/ML
ALBUMIN SERPL ELPH-MCNC: 4.7 G/DL
CA-I SERPL-SCNC: 1.19 MMOL/L
CALCIUM SERPL-MCNC: 10.3 MG/DL
TSH SERPL-ACNC: 0.21 UIU/ML

## 2022-02-11 ENCOUNTER — NON-APPOINTMENT (OUTPATIENT)
Age: 62
End: 2022-02-11

## 2022-02-11 ENCOUNTER — APPOINTMENT (OUTPATIENT)
Dept: OPHTHALMOLOGY | Facility: CLINIC | Age: 62
End: 2022-02-11
Payer: MEDICARE

## 2022-02-11 LAB — 24R-OH-CALCIDIOL SERPL-MCNC: 33.9 PG/ML

## 2022-02-11 PROCEDURE — 92014 COMPRE OPH EXAM EST PT 1/>: CPT

## 2022-03-07 ENCOUNTER — OUTPATIENT (OUTPATIENT)
Dept: OUTPATIENT SERVICES | Facility: HOSPITAL | Age: 62
LOS: 1 days | End: 2022-03-07
Payer: MEDICARE

## 2022-03-07 ENCOUNTER — APPOINTMENT (OUTPATIENT)
Dept: RADIOLOGY | Facility: HOSPITAL | Age: 62
End: 2022-03-07
Payer: MEDICARE

## 2022-03-07 PROCEDURE — 77080 DXA BONE DENSITY AXIAL: CPT

## 2022-03-07 PROCEDURE — 77080 DXA BONE DENSITY AXIAL: CPT | Mod: 26

## 2022-03-29 ENCOUNTER — RX RENEWAL (OUTPATIENT)
Age: 62
End: 2022-03-29

## 2022-05-18 ENCOUNTER — APPOINTMENT (OUTPATIENT)
Dept: ENDOCRINOLOGY | Facility: CLINIC | Age: 62
End: 2022-05-18
Payer: MEDICARE

## 2022-05-18 VITALS
SYSTOLIC BLOOD PRESSURE: 156 MMHG | WEIGHT: 192 LBS | DIASTOLIC BLOOD PRESSURE: 98 MMHG | BODY MASS INDEX: 29.19 KG/M2 | HEART RATE: 63 BPM

## 2022-05-18 PROCEDURE — 99214 OFFICE O/P EST MOD 30 MIN: CPT

## 2022-05-18 RX ORDER — LEVOTHYROXINE SODIUM 0.11 MG/1
112 TABLET ORAL
Qty: 90 | Refills: 3 | Status: COMPLETED | COMMUNITY
Start: 2021-04-30 | End: 2022-05-18

## 2022-05-19 LAB
25(OH)D3 SERPL-MCNC: 68.2 NG/ML
ALBUMIN SERPL ELPH-MCNC: 4.3 G/DL
CALCIUM SERPL-MCNC: 9.1 MG/DL
CREAT SERPL-MCNC: 0.84 MG/DL
EGFR: 78 ML/MIN/1.73M2
PHOSPHATE SERPL-MCNC: 4 MG/DL
T4 FREE SERPL-MCNC: 1.5 NG/DL
TSH SERPL-ACNC: 1.17 UIU/ML

## 2022-06-01 ENCOUNTER — NON-APPOINTMENT (OUTPATIENT)
Age: 62
End: 2022-06-01

## 2022-07-19 PROBLEM — H40.003 GLAUCOMA SUSPECT OF BOTH EYES: Status: ACTIVE | Noted: 2017-09-28

## 2022-08-03 ENCOUNTER — NON-APPOINTMENT (OUTPATIENT)
Age: 62
End: 2022-08-03

## 2022-08-03 RX ORDER — HYDROCHLOROTHIAZIDE 12.5 MG/1
12.5 CAPSULE ORAL
Qty: 30 | Refills: 0 | Status: COMPLETED | COMMUNITY
Start: 2016-08-01 | End: 2022-08-03

## 2022-08-03 RX ORDER — METOPROLOL TARTRATE 75 MG/1
TABLET, FILM COATED ORAL
Refills: 0 | Status: COMPLETED | COMMUNITY
End: 2022-08-03

## 2022-08-05 ENCOUNTER — APPOINTMENT (OUTPATIENT)
Dept: ENDOCRINOLOGY | Facility: CLINIC | Age: 62
End: 2022-08-05

## 2022-08-05 VITALS — DIASTOLIC BLOOD PRESSURE: 80 MMHG | SYSTOLIC BLOOD PRESSURE: 116 MMHG | HEART RATE: 72 BPM

## 2022-08-05 VITALS
HEART RATE: 69 BPM | OXYGEN SATURATION: 100 % | DIASTOLIC BLOOD PRESSURE: 75 MMHG | SYSTOLIC BLOOD PRESSURE: 131 MMHG | HEIGHT: 68 IN | TEMPERATURE: 97.6 F | WEIGHT: 187 LBS | BODY MASS INDEX: 28.34 KG/M2

## 2022-08-05 VITALS — HEART RATE: 72 BPM | SYSTOLIC BLOOD PRESSURE: 106 MMHG | DIASTOLIC BLOOD PRESSURE: 69 MMHG

## 2022-08-05 PROCEDURE — 99214 OFFICE O/P EST MOD 30 MIN: CPT

## 2022-08-08 LAB
24R-OH-CALCIDIOL SERPL-MCNC: 24 PG/ML
25(OH)D3 SERPL-MCNC: 79 NG/ML
ALBUMIN SERPL ELPH-MCNC: 4.6 G/DL
CA-I SERPL-SCNC: 4.9 MG/DL
CALCIUM SERPL-MCNC: 9.6 MG/DL
CREAT SERPL-MCNC: 0.96 MG/DL
EGFR: 66 ML/MIN/1.73M2
PHOSPHATE SERPL-MCNC: 4 MG/DL
TSH SERPL-ACNC: 1.21 UIU/ML

## 2022-08-25 ENCOUNTER — TRANSCRIPTION ENCOUNTER (OUTPATIENT)
Age: 62
End: 2022-08-25

## 2022-09-07 ENCOUNTER — RX RENEWAL (OUTPATIENT)
Age: 62
End: 2022-09-07

## 2022-09-08 ENCOUNTER — RX RENEWAL (OUTPATIENT)
Age: 62
End: 2022-09-08

## 2022-10-17 ENCOUNTER — APPOINTMENT (OUTPATIENT)
Dept: ENDOCRINOLOGY | Facility: CLINIC | Age: 62
End: 2022-10-17

## 2022-10-17 VITALS — BODY MASS INDEX: 28.43 KG/M2 | WEIGHT: 187 LBS

## 2022-10-17 DIAGNOSIS — Z13.820 ENCOUNTER FOR SCREENING FOR OSTEOPOROSIS: ICD-10-CM

## 2022-10-17 PROCEDURE — 99442: CPT

## 2022-10-17 RX ORDER — SEMAGLUTIDE 1.34 MG/ML
4 INJECTION, SOLUTION SUBCUTANEOUS
Qty: 3 | Refills: 3 | Status: COMPLETED | COMMUNITY
Start: 2022-01-12 | End: 2022-10-17

## 2022-12-02 ENCOUNTER — RX RENEWAL (OUTPATIENT)
Age: 62
End: 2022-12-02

## 2022-12-29 ENCOUNTER — RX RENEWAL (OUTPATIENT)
Age: 62
End: 2022-12-29

## 2023-01-03 ENCOUNTER — RX RENEWAL (OUTPATIENT)
Age: 63
End: 2023-01-03

## 2023-02-16 ENCOUNTER — NON-APPOINTMENT (OUTPATIENT)
Age: 63
End: 2023-02-16

## 2023-02-24 ENCOUNTER — APPOINTMENT (OUTPATIENT)
Dept: OPHTHALMOLOGY | Facility: CLINIC | Age: 63
End: 2023-02-24
Payer: MEDICARE

## 2023-02-24 ENCOUNTER — NON-APPOINTMENT (OUTPATIENT)
Age: 63
End: 2023-02-24

## 2023-02-24 PROCEDURE — 92014 COMPRE OPH EXAM EST PT 1/>: CPT

## 2023-03-03 ENCOUNTER — RX RENEWAL (OUTPATIENT)
Age: 63
End: 2023-03-03

## 2023-03-07 ENCOUNTER — APPOINTMENT (OUTPATIENT)
Dept: ENDOCRINOLOGY | Facility: CLINIC | Age: 63
End: 2023-03-07
Payer: MEDICARE

## 2023-03-07 VITALS
HEART RATE: 62 BPM | DIASTOLIC BLOOD PRESSURE: 82 MMHG | WEIGHT: 174 LBS | SYSTOLIC BLOOD PRESSURE: 139 MMHG | BODY MASS INDEX: 26.46 KG/M2

## 2023-03-07 PROCEDURE — 99215 OFFICE O/P EST HI 40 MIN: CPT

## 2023-03-08 LAB
25(OH)D3 SERPL-MCNC: 69.8 NG/ML
ALBUMIN SERPL ELPH-MCNC: 4.2 G/DL
CALCIUM SERPL-MCNC: 9.3 MG/DL
CREAT SERPL-MCNC: 0.88 MG/DL
EGFR: 73 ML/MIN/1.73M2
PHOSPHATE SERPL-MCNC: 4.1 MG/DL
TSH SERPL-ACNC: 1.47 UIU/ML

## 2023-05-22 ENCOUNTER — RX RENEWAL (OUTPATIENT)
Age: 63
End: 2023-05-22

## 2023-05-31 ENCOUNTER — RX RENEWAL (OUTPATIENT)
Age: 63
End: 2023-05-31

## 2023-06-06 ENCOUNTER — APPOINTMENT (OUTPATIENT)
Dept: ENDOCRINOLOGY | Facility: CLINIC | Age: 63
End: 2023-06-06
Payer: MEDICARE

## 2023-06-06 VITALS
DIASTOLIC BLOOD PRESSURE: 81 MMHG | WEIGHT: 174 LBS | SYSTOLIC BLOOD PRESSURE: 161 MMHG | HEART RATE: 61 BPM | BODY MASS INDEX: 26.46 KG/M2

## 2023-06-06 DIAGNOSIS — R10.9 UNSPECIFIED ABDOMINAL PAIN: ICD-10-CM

## 2023-06-06 DIAGNOSIS — N83.292 OTHER OVARIAN CYST, LEFT SIDE: ICD-10-CM

## 2023-06-06 DIAGNOSIS — R26.89 OTHER ABNORMALITIES OF GAIT AND MOBILITY: ICD-10-CM

## 2023-06-06 LAB
25(OH)D3 SERPL-MCNC: 68.3 NG/ML
TSH SERPL-ACNC: 1.83 UIU/ML

## 2023-06-06 PROCEDURE — 99215 OFFICE O/P EST HI 40 MIN: CPT

## 2023-06-07 LAB
ALBUMIN SERPL ELPH-MCNC: 4.7 G/DL
ALP BLD-CCNC: 81 U/L
ALT SERPL-CCNC: 10 U/L
AST SERPL-CCNC: 19 U/L
BILIRUB DIRECT SERPL-MCNC: 0.1 MG/DL
BILIRUB INDIRECT SERPL-MCNC: 0.1 MG/DL
BILIRUB SERPL-MCNC: 0.2 MG/DL
CA-I SERPL-SCNC: 4.8 MG/DL
CALCIUM SERPL-MCNC: 9 MG/DL
CREAT SERPL-MCNC: 0.89 MG/DL
EGFR: 72 ML/MIN/1.73M2
ESTIMATED AVERAGE GLUCOSE: 105 MG/DL
HBA1C MFR BLD HPLC: 5.3 %
PHOSPHATE SERPL-MCNC: 4.5 MG/DL
PROT SERPL-MCNC: 6.9 G/DL

## 2023-07-18 ENCOUNTER — APPOINTMENT (OUTPATIENT)
Dept: OBGYN | Facility: CLINIC | Age: 63
End: 2023-07-18
Payer: MEDICARE

## 2023-07-18 VITALS
OXYGEN SATURATION: 99 % | BODY MASS INDEX: 26.22 KG/M2 | DIASTOLIC BLOOD PRESSURE: 77 MMHG | SYSTOLIC BLOOD PRESSURE: 109 MMHG | HEIGHT: 68 IN | WEIGHT: 173 LBS | HEART RATE: 65 BPM

## 2023-07-18 DIAGNOSIS — N83.292 OTHER OVARIAN CYST, LEFT SIDE: ICD-10-CM

## 2023-07-18 DIAGNOSIS — Z01.419 ENCOUNTER FOR GYNECOLOGICAL EXAMINATION (GENERAL) (ROUTINE) W/OUT ABNORMAL FINDINGS: ICD-10-CM

## 2023-07-18 PROCEDURE — G0101: CPT

## 2023-07-18 NOTE — PHYSICAL EXAM
[Chaperone Present] : A chaperone was present in the examining room during all aspects of the physical examination [Appropriately responsive] : appropriately responsive [Alert] : alert [No Acute Distress] : no acute distress [No Lymphadenopathy] : no lymphadenopathy [Regular Rate Rhythm] : regular rate rhythm [Clear to Auscultation B/L] : clear to auscultation bilaterally [Soft] : soft [Non-tender] : non-tender [Non-distended] : non-distended [No Mass] : no mass [Oriented x3] : oriented x3 [Examination Of The Breasts] : a normal appearance [No Masses] : no breast masses were palpable [Labia Majora] : normal [Labia Minora] : normal [Atrophy] : atrophy [Normal] : normal [FreeTextEntry6] : pt does have mild tenderness llq/ left adnexa

## 2023-07-18 NOTE — REVIEW OF SYSTEMS
[Negative] : Cardiovascular [FreeTextEntry2] : early menopuasue due to radioactive iodine [FreeTextEntry9] : multiple issues: see notes [de-identified] : disc disease in back [de-identified] : see DR Almaguer notes, hx of radioiodine

## 2023-07-18 NOTE — HISTORY OF PRESENT ILLNESS
[postmenopausal] : postmenopausal [No] : none [Patient reported mammogram was normal] : Patient reported mammogram was normal [Patient reported colonoscopy was normal] : Patient reported colonoscopy was normal [FreeTextEntry1] : Patient presents for annual visit.  [Mammogramdate] : 12/2022 [TextBox_37] : gest through dr rodriguez [ColonoscopyDate] : 2021

## 2023-07-18 NOTE — PLAN
[FreeTextEntry1] : annual: pap and hpv\par \par multiple medical problems\par hx of left ov cyst and some left pain though could be due to multiple hip surgery and disc disease  in back\par will get complete pelvic sono\par \par routine health mtc issues and screens discussed

## 2023-07-21 ENCOUNTER — TRANSCRIPTION ENCOUNTER (OUTPATIENT)
Age: 63
End: 2023-07-21

## 2023-07-25 ENCOUNTER — TRANSCRIPTION ENCOUNTER (OUTPATIENT)
Age: 63
End: 2023-07-25

## 2023-07-25 LAB
CYTOLOGY CVX/VAG DOC THIN PREP: ABNORMAL
HPV HIGH+LOW RISK DNA PNL CVX: NOT DETECTED

## 2023-07-26 ENCOUNTER — RX RENEWAL (OUTPATIENT)
Age: 63
End: 2023-07-26

## 2023-08-11 ENCOUNTER — NON-APPOINTMENT (OUTPATIENT)
Age: 63
End: 2023-08-11

## 2023-08-22 ENCOUNTER — NON-APPOINTMENT (OUTPATIENT)
Age: 63
End: 2023-08-22

## 2023-09-19 LAB
25(OH)D3 SERPL-MCNC: 74 NG/ML
ALBUMIN SERPL ELPH-MCNC: 4.6 G/DL
ALP BLD-CCNC: 89 U/L
ALT SERPL-CCNC: 27 U/L
AST SERPL-CCNC: 25 U/L
BILIRUB DIRECT SERPL-MCNC: 0.1 MG/DL
BILIRUB INDIRECT SERPL-MCNC: 0.2 MG/DL
BILIRUB SERPL-MCNC: 0.3 MG/DL
CA-I SERPL-SCNC: 5.1 MG/DL
CALCIUM SERPL-MCNC: 10.3 MG/DL
CREAT SERPL-MCNC: 0.86 MG/DL
EGFR: 75 ML/MIN/1.73M2
PHOSPHATE SERPL-MCNC: 4.1 MG/DL
PROT SERPL-MCNC: 6.8 G/DL
TSH SERPL-ACNC: 1.01 UIU/ML

## 2023-09-20 ENCOUNTER — APPOINTMENT (OUTPATIENT)
Dept: ENDOCRINOLOGY | Facility: CLINIC | Age: 63
End: 2023-09-20
Payer: MEDICARE

## 2023-09-20 VITALS — WEIGHT: 171 LBS | BODY MASS INDEX: 26 KG/M2

## 2023-09-20 DIAGNOSIS — E89.0 POSTPROCEDURAL HYPOTHYROIDISM: ICD-10-CM

## 2023-09-20 PROCEDURE — 99214 OFFICE O/P EST MOD 30 MIN: CPT | Mod: 95

## 2023-09-20 RX ORDER — CHLORZOXAZONE 500 MG/1
500 TABLET ORAL
Refills: 0 | Status: COMPLETED | COMMUNITY
End: 2023-09-20

## 2024-01-31 ENCOUNTER — APPOINTMENT (OUTPATIENT)
Dept: ENDOCRINOLOGY | Facility: CLINIC | Age: 64
End: 2024-01-31
Payer: MEDICARE

## 2024-01-31 VITALS
WEIGHT: 159 LBS | BODY MASS INDEX: 24.18 KG/M2 | HEART RATE: 69 BPM | DIASTOLIC BLOOD PRESSURE: 78 MMHG | SYSTOLIC BLOOD PRESSURE: 141 MMHG

## 2024-01-31 DIAGNOSIS — E55.9 VITAMIN D DEFICIENCY, UNSPECIFIED: ICD-10-CM

## 2024-01-31 DIAGNOSIS — E66.3 OVERWEIGHT: ICD-10-CM

## 2024-01-31 DIAGNOSIS — E03.9 HYPOTHYROIDISM, UNSPECIFIED: ICD-10-CM

## 2024-01-31 DIAGNOSIS — E89.2 POSTPROCEDURAL HYPOPARATHYROIDISM: ICD-10-CM

## 2024-01-31 PROCEDURE — 99215 OFFICE O/P EST HI 40 MIN: CPT

## 2024-01-31 PROCEDURE — G2211 COMPLEX E/M VISIT ADD ON: CPT

## 2024-02-01 LAB
25(OH)D3 SERPL-MCNC: 68.5 NG/ML
ALBUMIN SERPL ELPH-MCNC: 4.4 G/DL
ALP BLD-CCNC: 56 U/L
ALT SERPL-CCNC: 11 U/L
AST SERPL-CCNC: 23 U/L
BILIRUB DIRECT SERPL-MCNC: 0.1 MG/DL
BILIRUB INDIRECT SERPL-MCNC: 0.3 MG/DL
BILIRUB SERPL-MCNC: 0.4 MG/DL
CALCIUM SERPL-MCNC: 9.9 MG/DL
CREAT SERPL-MCNC: 0.88 MG/DL
EGFR: 73 ML/MIN/1.73M2
ESTIMATED AVERAGE GLUCOSE: 97 MG/DL
HBA1C MFR BLD HPLC: 5 %
PHOSPHATE SERPL-MCNC: 4.1 MG/DL
PROT SERPL-MCNC: 6.9 G/DL
TSH SERPL-ACNC: 2.16 UIU/ML

## 2024-02-07 ENCOUNTER — RX RENEWAL (OUTPATIENT)
Age: 64
End: 2024-02-07

## 2024-02-08 RX ORDER — CALCITRIOL 0.25 UG/1
0.25 CAPSULE, LIQUID FILLED ORAL
Qty: 180 | Refills: 1 | Status: ACTIVE | COMMUNITY
Start: 2020-06-30 | End: 1900-01-01

## 2024-04-05 ENCOUNTER — NON-APPOINTMENT (OUTPATIENT)
Age: 64
End: 2024-04-05

## 2024-04-05 ENCOUNTER — APPOINTMENT (OUTPATIENT)
Dept: OPHTHALMOLOGY | Facility: CLINIC | Age: 64
End: 2024-04-05
Payer: MEDICARE

## 2024-04-05 PROCEDURE — 92014 COMPRE OPH EXAM EST PT 1/>: CPT

## 2024-04-09 ENCOUNTER — APPOINTMENT (OUTPATIENT)
Dept: NEUROLOGY | Facility: CLINIC | Age: 64
End: 2024-04-09
Payer: MEDICARE

## 2024-04-09 VITALS
SYSTOLIC BLOOD PRESSURE: 146 MMHG | BODY MASS INDEX: 24.86 KG/M2 | HEIGHT: 68 IN | DIASTOLIC BLOOD PRESSURE: 79 MMHG | HEART RATE: 65 BPM | WEIGHT: 164 LBS | TEMPERATURE: 97 F | OXYGEN SATURATION: 95 %

## 2024-04-09 DIAGNOSIS — R55 SYNCOPE AND COLLAPSE: ICD-10-CM

## 2024-04-09 DIAGNOSIS — G62.9 POLYNEUROPATHY, UNSPECIFIED: ICD-10-CM

## 2024-04-09 PROCEDURE — 99205 OFFICE O/P NEW HI 60 MIN: CPT

## 2024-04-09 PROCEDURE — G2211 COMPLEX E/M VISIT ADD ON: CPT

## 2024-04-09 RX ORDER — CITALOPRAM 40 MG/1
40 TABLET, FILM COATED ORAL DAILY
Refills: 0 | Status: ACTIVE | COMMUNITY

## 2024-04-09 RX ORDER — GABAPENTIN 400 MG/1
400 CAPSULE ORAL TWICE DAILY
Refills: 0 | Status: ACTIVE | COMMUNITY

## 2024-04-09 RX ORDER — BUSPIRONE HYDROCHLORIDE 15 MG/1
15 TABLET ORAL DAILY
Refills: 0 | Status: ACTIVE | COMMUNITY

## 2024-04-09 RX ORDER — LOSARTAN POTASSIUM 50 MG/1
50 TABLET, FILM COATED ORAL DAILY
Refills: 0 | Status: ACTIVE | COMMUNITY

## 2024-04-09 RX ORDER — PANTOPRAZOLE SODIUM 20 MG/1
TABLET, DELAYED RELEASE ORAL
Refills: 0 | Status: DISCONTINUED | COMMUNITY
End: 2024-04-09

## 2024-04-09 RX ORDER — OXYCODONE AND ACETAMINOPHEN 5; 325 MG/1; MG/1
5-325 TABLET ORAL EVERY 8 HOURS
Qty: 10 | Refills: 0 | Status: DISCONTINUED | COMMUNITY
Start: 2019-04-01 | End: 2024-04-09

## 2024-04-09 RX ORDER — METOPROLOL SUCCINATE 50 MG/1
50 TABLET, EXTENDED RELEASE ORAL DAILY
Refills: 0 | Status: ACTIVE | COMMUNITY

## 2024-04-09 RX ORDER — APIXABAN 5 MG/1
5 TABLET, FILM COATED ORAL
Refills: 0 | Status: ACTIVE | COMMUNITY

## 2024-04-09 RX ORDER — PANTOPRAZOLE SODIUM 40 MG/1
40 TABLET, DELAYED RELEASE ORAL DAILY
Refills: 0 | Status: ACTIVE | COMMUNITY

## 2024-04-09 RX ORDER — ALPRAZOLAM 0.5 MG/1
0.5 TABLET ORAL
Qty: 2 | Refills: 0 | Status: DISCONTINUED | COMMUNITY
Start: 2019-02-19 | End: 2024-04-09

## 2024-04-11 LAB
ALBUMIN MFR SERPL ELPH: 59.8 %
ALBUMIN SERPL-MCNC: 3.8 G/DL
ALBUMIN/GLOB SERPL: 1.5 RATIO
ALPHA1 GLOB MFR SERPL ELPH: 4.6 %
ALPHA1 GLOB SERPL ELPH-MCNC: 0.3 G/DL
ALPHA2 GLOB MFR SERPL ELPH: 10.6 %
ALPHA2 GLOB SERPL ELPH-MCNC: 0.7 G/DL
B-GLOBULIN MFR SERPL ELPH: 12.6 %
B-GLOBULIN SERPL ELPH-MCNC: 0.8 G/DL
ERYTHROCYTE [SEDIMENTATION RATE] IN BLOOD BY WESTERGREN METHOD: 13 MM/HR
GAMMA GLOB FLD ELPH-MCNC: 0.8 G/DL
GAMMA GLOB MFR SERPL ELPH: 12.4 %
INTERPRETATION SERPL IEP-IMP: NORMAL
PROT SERPL-MCNC: 6.4 G/DL
PROT SERPL-MCNC: 6.4 G/DL
VIT B12 SERPL-MCNC: 500 PG/ML

## 2024-04-15 ENCOUNTER — NON-APPOINTMENT (OUTPATIENT)
Age: 64
End: 2024-04-15

## 2024-04-16 ENCOUNTER — APPOINTMENT (OUTPATIENT)
Dept: NEUROLOGY | Facility: CLINIC | Age: 64
End: 2024-04-16
Payer: MEDICARE

## 2024-04-16 PROCEDURE — 95813 EEG EXTND MNTR 61-119 MIN: CPT

## 2024-04-24 ENCOUNTER — TRANSCRIPTION ENCOUNTER (OUTPATIENT)
Age: 64
End: 2024-04-24

## 2024-05-22 ENCOUNTER — APPOINTMENT (OUTPATIENT)
Dept: NEUROLOGY | Facility: CLINIC | Age: 64
End: 2024-05-22
Payer: MEDICARE

## 2024-05-22 VITALS
WEIGHT: 159 LBS | HEIGHT: 67.5 IN | DIASTOLIC BLOOD PRESSURE: 78 MMHG | BODY MASS INDEX: 24.66 KG/M2 | TEMPERATURE: 97.5 F | HEART RATE: 79 BPM | SYSTOLIC BLOOD PRESSURE: 119 MMHG | OXYGEN SATURATION: 96 %

## 2024-05-22 PROCEDURE — G2211 COMPLEX E/M VISIT ADD ON: CPT

## 2024-05-22 PROCEDURE — 99215 OFFICE O/P EST HI 40 MIN: CPT

## 2024-05-22 RX ORDER — DIAZEPAM 2 MG/1
2 TABLET ORAL
Qty: 1 | Refills: 0 | Status: DISCONTINUED | COMMUNITY
Start: 2024-04-09 | End: 2024-05-22

## 2024-05-22 RX ORDER — SEMAGLUTIDE 2.68 MG/ML
8 INJECTION, SOLUTION SUBCUTANEOUS
Qty: 3 | Refills: 1 | Status: DISCONTINUED | COMMUNITY
Start: 2022-10-17 | End: 2024-05-22

## 2024-05-22 RX ORDER — METFORMIN ER 500 MG 500 MG/1
500 TABLET ORAL
Qty: 180 | Refills: 0 | Status: DISCONTINUED | COMMUNITY
Start: 2020-09-02 | End: 2024-05-22

## 2024-06-21 ENCOUNTER — NON-APPOINTMENT (OUTPATIENT)
Age: 64
End: 2024-06-21

## 2024-07-01 ENCOUNTER — RX RENEWAL (OUTPATIENT)
Age: 64
End: 2024-07-01

## 2024-07-03 ENCOUNTER — APPOINTMENT (OUTPATIENT)
Dept: NEUROLOGY | Facility: CLINIC | Age: 64
End: 2024-07-03
Payer: MEDICARE

## 2024-07-03 VITALS
HEIGHT: 67.5 IN | SYSTOLIC BLOOD PRESSURE: 155 MMHG | HEART RATE: 72 BPM | BODY MASS INDEX: 24.82 KG/M2 | OXYGEN SATURATION: 95 % | WEIGHT: 160 LBS | DIASTOLIC BLOOD PRESSURE: 81 MMHG

## 2024-07-03 DIAGNOSIS — R26.89 OTHER ABNORMALITIES OF GAIT AND MOBILITY: ICD-10-CM

## 2024-07-03 DIAGNOSIS — G62.9 POLYNEUROPATHY, UNSPECIFIED: ICD-10-CM

## 2024-07-03 DIAGNOSIS — G56.21 LESION OF ULNAR NERVE, RIGHT UPPER LIMB: ICD-10-CM

## 2024-07-03 DIAGNOSIS — G56.02 CARPAL TUNNEL SYNDROME, LEFT UPPER LIMB: ICD-10-CM

## 2024-07-03 PROCEDURE — 99214 OFFICE O/P EST MOD 30 MIN: CPT | Mod: 25

## 2024-07-03 PROCEDURE — 95913 NRV CNDJ TEST 13/> STUDIES: CPT

## 2024-07-05 RX ORDER — SEMAGLUTIDE 0.68 MG/ML
2 INJECTION, SOLUTION SUBCUTANEOUS
Qty: 1 | Refills: 1 | Status: ACTIVE | COMMUNITY
Start: 2024-07-05 | End: 1900-01-01

## 2024-08-12 ENCOUNTER — NON-APPOINTMENT (OUTPATIENT)
Age: 64
End: 2024-08-12

## 2024-08-13 ENCOUNTER — NON-APPOINTMENT (OUTPATIENT)
Age: 64
End: 2024-08-13

## 2024-08-13 ENCOUNTER — APPOINTMENT (OUTPATIENT)
Dept: NEUROLOGY | Facility: CLINIC | Age: 64
End: 2024-08-13
Payer: MEDICARE

## 2024-08-14 ENCOUNTER — RX RENEWAL (OUTPATIENT)
Age: 64
End: 2024-08-14

## 2024-08-14 ENCOUNTER — APPOINTMENT (OUTPATIENT)
Dept: NEUROLOGY | Facility: CLINIC | Age: 64
End: 2024-08-14
Payer: MEDICARE

## 2024-08-14 ENCOUNTER — APPOINTMENT (OUTPATIENT)
Dept: NEUROLOGY | Facility: CLINIC | Age: 64
End: 2024-08-14

## 2024-08-14 VITALS
SYSTOLIC BLOOD PRESSURE: 148 MMHG | WEIGHT: 165 LBS | BODY MASS INDEX: 25.59 KG/M2 | TEMPERATURE: 97.4 F | HEIGHT: 67.5 IN | DIASTOLIC BLOOD PRESSURE: 77 MMHG | HEART RATE: 79 BPM | OXYGEN SATURATION: 98 %

## 2024-08-14 DIAGNOSIS — R29.898 OTHER SYMPTOMS AND SIGNS INVOLVING THE MUSCULOSKELETAL SYSTEM: ICD-10-CM

## 2024-08-14 PROCEDURE — 95708 EEG WO VID EA 12-26HR UNMNTR: CPT

## 2024-08-14 PROCEDURE — 99214 OFFICE O/P EST MOD 30 MIN: CPT

## 2024-08-14 PROCEDURE — G2211 COMPLEX E/M VISIT ADD ON: CPT

## 2024-08-14 PROCEDURE — 95700 EEG CONT REC W/VID EEG TECH: CPT

## 2024-08-14 PROCEDURE — 95719 EEG PHYS/QHP EA INCR W/O VID: CPT

## 2024-08-21 ENCOUNTER — APPOINTMENT (OUTPATIENT)
Dept: ENDOCRINOLOGY | Facility: CLINIC | Age: 64
End: 2024-08-21
Payer: MEDICARE

## 2024-08-21 ENCOUNTER — TRANSCRIPTION ENCOUNTER (OUTPATIENT)
Age: 64
End: 2024-08-21

## 2024-08-21 VITALS
BODY MASS INDEX: 25.15 KG/M2 | DIASTOLIC BLOOD PRESSURE: 81 MMHG | SYSTOLIC BLOOD PRESSURE: 158 MMHG | WEIGHT: 163 LBS | HEART RATE: 61 BPM

## 2024-08-21 DIAGNOSIS — E89.2 POSTPROCEDURAL HYPOPARATHYROIDISM: ICD-10-CM

## 2024-08-21 DIAGNOSIS — E03.9 HYPOTHYROIDISM, UNSPECIFIED: ICD-10-CM

## 2024-08-21 DIAGNOSIS — E66.3 OVERWEIGHT: ICD-10-CM

## 2024-08-21 DIAGNOSIS — E55.9 VITAMIN D DEFICIENCY, UNSPECIFIED: ICD-10-CM

## 2024-08-21 PROCEDURE — G2211 COMPLEX E/M VISIT ADD ON: CPT

## 2024-08-21 PROCEDURE — 99214 OFFICE O/P EST MOD 30 MIN: CPT

## 2024-09-03 ENCOUNTER — TRANSCRIPTION ENCOUNTER (OUTPATIENT)
Age: 64
End: 2024-09-03

## 2024-09-17 ENCOUNTER — APPOINTMENT (OUTPATIENT)
Dept: NEUROLOGY | Facility: CLINIC | Age: 64
End: 2024-09-17

## 2024-09-18 ENCOUNTER — NON-APPOINTMENT (OUTPATIENT)
Age: 64
End: 2024-09-18

## 2024-09-18 ENCOUNTER — RX RENEWAL (OUTPATIENT)
Age: 64
End: 2024-09-18

## 2024-10-04 ENCOUNTER — APPOINTMENT (OUTPATIENT)
Dept: OPHTHALMOLOGY | Facility: CLINIC | Age: 64
End: 2024-10-04
Payer: MEDICARE

## 2024-10-04 ENCOUNTER — NON-APPOINTMENT (OUTPATIENT)
Age: 64
End: 2024-10-04

## 2024-10-04 PROCEDURE — 92012 INTRM OPH EXAM EST PATIENT: CPT

## 2024-11-04 ENCOUNTER — RX RENEWAL (OUTPATIENT)
Age: 64
End: 2024-11-04

## 2024-11-20 ENCOUNTER — RX RENEWAL (OUTPATIENT)
Age: 64
End: 2024-11-20

## 2024-12-09 ENCOUNTER — RX RENEWAL (OUTPATIENT)
Age: 64
End: 2024-12-09

## 2024-12-10 ENCOUNTER — RX RENEWAL (OUTPATIENT)
Age: 64
End: 2024-12-10

## 2024-12-30 ENCOUNTER — APPOINTMENT (OUTPATIENT)
Dept: ENDOCRINOLOGY | Facility: CLINIC | Age: 64
End: 2024-12-30

## 2025-02-21 ENCOUNTER — APPOINTMENT (OUTPATIENT)
Dept: OPHTHALMOLOGY | Facility: CLINIC | Age: 65
End: 2025-02-21
Payer: MEDICARE

## 2025-02-21 ENCOUNTER — NON-APPOINTMENT (OUTPATIENT)
Age: 65
End: 2025-02-21

## 2025-02-21 LAB
25(OH)D3 SERPL-MCNC: 59 NG/ML
ALBUMIN SERPL ELPH-MCNC: 4.2 G/DL
ALP BLD-CCNC: 69 U/L
ALT SERPL-CCNC: 10 U/L
AST SERPL-CCNC: 21 U/L
BILIRUB DIRECT SERPL-MCNC: 0.1 MG/DL
BILIRUB INDIRECT SERPL-MCNC: 0.2 MG/DL
BILIRUB SERPL-MCNC: 0.4 MG/DL
CALCIUM SERPL-MCNC: 9.2 MG/DL
CALCIUM SERPL-MCNC: 9.2 MG/DL
ESTIMATED AVERAGE GLUCOSE: 97 MG/DL
HBA1C MFR BLD HPLC: 5 %
PARATHYROID HORMONE INTACT: 6 PG/ML
PHOSPHATE SERPL-MCNC: 3.9 MG/DL
PROT SERPL-MCNC: 6.4 G/DL
TSH SERPL-ACNC: 1.19 UIU/ML

## 2025-02-21 PROCEDURE — 92012 INTRM OPH EXAM EST PATIENT: CPT

## 2025-02-21 PROCEDURE — 92134 CPTRZ OPH DX IMG PST SGM RTA: CPT

## 2025-02-21 PROCEDURE — 92136 OPHTHALMIC BIOMETRY: CPT

## 2025-02-25 ENCOUNTER — APPOINTMENT (OUTPATIENT)
Dept: ENDOCRINOLOGY | Facility: CLINIC | Age: 65
End: 2025-02-25
Payer: MEDICARE

## 2025-02-25 VITALS — BODY MASS INDEX: 25.62 KG/M2 | WEIGHT: 166 LBS

## 2025-02-25 DIAGNOSIS — E03.9 HYPOTHYROIDISM, UNSPECIFIED: ICD-10-CM

## 2025-02-25 DIAGNOSIS — E89.2 POSTPROCEDURAL HYPOPARATHYROIDISM: ICD-10-CM

## 2025-02-25 DIAGNOSIS — Z13.1 ENCOUNTER FOR SCREENING FOR DIABETES MELLITUS: ICD-10-CM

## 2025-02-25 DIAGNOSIS — E55.9 VITAMIN D DEFICIENCY, UNSPECIFIED: ICD-10-CM

## 2025-02-25 DIAGNOSIS — E66.3 OVERWEIGHT: ICD-10-CM

## 2025-02-25 PROCEDURE — G2211 COMPLEX E/M VISIT ADD ON: CPT | Mod: 2W

## 2025-02-25 PROCEDURE — 99214 OFFICE O/P EST MOD 30 MIN: CPT | Mod: 2W

## 2025-03-25 ENCOUNTER — TRANSCRIPTION ENCOUNTER (OUTPATIENT)
Age: 65
End: 2025-03-25

## 2025-03-31 ENCOUNTER — NON-APPOINTMENT (OUTPATIENT)
Age: 65
End: 2025-03-31

## 2025-04-01 ENCOUNTER — RX RENEWAL (OUTPATIENT)
Age: 65
End: 2025-04-01

## 2025-04-01 ENCOUNTER — APPOINTMENT (OUTPATIENT)
Dept: NEUROLOGY | Facility: CLINIC | Age: 65
End: 2025-04-01

## 2025-04-08 ENCOUNTER — APPOINTMENT (OUTPATIENT)
Dept: NEUROLOGY | Facility: CLINIC | Age: 65
End: 2025-04-08
Payer: MEDICARE

## 2025-04-08 VITALS
OXYGEN SATURATION: 98 % | TEMPERATURE: 97.5 F | HEIGHT: 67 IN | WEIGHT: 163 LBS | BODY MASS INDEX: 25.58 KG/M2 | HEART RATE: 68 BPM | SYSTOLIC BLOOD PRESSURE: 130 MMHG | DIASTOLIC BLOOD PRESSURE: 67 MMHG

## 2025-04-08 PROCEDURE — 99214 OFFICE O/P EST MOD 30 MIN: CPT

## 2025-04-08 PROCEDURE — G2211 COMPLEX E/M VISIT ADD ON: CPT

## 2025-04-28 ENCOUNTER — TRANSCRIPTION ENCOUNTER (OUTPATIENT)
Age: 65
End: 2025-04-28

## 2025-04-28 RX ORDER — SEMAGLUTIDE 0.68 MG/ML
2 INJECTION, SOLUTION SUBCUTANEOUS
Qty: 1 | Refills: 0 | Status: ACTIVE | COMMUNITY
Start: 2025-04-28 | End: 1900-01-01

## 2025-05-20 ENCOUNTER — TRANSCRIPTION ENCOUNTER (OUTPATIENT)
Age: 65
End: 2025-05-20

## 2025-06-02 ENCOUNTER — INPATIENT (INPATIENT)
Facility: HOSPITAL | Age: 65
LOS: 3 days | Discharge: HOME HEALTH | End: 2025-06-06
Attending: PSYCHIATRY & NEUROLOGY | Admitting: PSYCHIATRY & NEUROLOGY
Payer: MEDICARE

## 2025-06-02 VITALS
OXYGEN SATURATION: 97 % | WEIGHT: 166.89 LBS | RESPIRATION RATE: 17 BRPM | HEIGHT: 67 IN | DIASTOLIC BLOOD PRESSURE: 65 MMHG | TEMPERATURE: 98 F | HEART RATE: 58 BPM | SYSTOLIC BLOOD PRESSURE: 99 MMHG

## 2025-06-02 DIAGNOSIS — R55 SYNCOPE AND COLLAPSE: ICD-10-CM

## 2025-06-02 DIAGNOSIS — I10 ESSENTIAL (PRIMARY) HYPERTENSION: ICD-10-CM

## 2025-06-02 DIAGNOSIS — E55.9 VITAMIN D DEFICIENCY, UNSPECIFIED: ICD-10-CM

## 2025-06-02 DIAGNOSIS — Z29.9 ENCOUNTER FOR PROPHYLACTIC MEASURES, UNSPECIFIED: ICD-10-CM

## 2025-06-02 DIAGNOSIS — Z96.642 PRESENCE OF LEFT ARTIFICIAL HIP JOINT: Chronic | ICD-10-CM

## 2025-06-02 DIAGNOSIS — Z98.890 OTHER SPECIFIED POSTPROCEDURAL STATES: Chronic | ICD-10-CM

## 2025-06-02 DIAGNOSIS — Z87.898 PERSONAL HISTORY OF OTHER SPECIFIED CONDITIONS: ICD-10-CM

## 2025-06-02 DIAGNOSIS — E03.9 HYPOTHYROIDISM, UNSPECIFIED: ICD-10-CM

## 2025-06-02 DIAGNOSIS — F32.9 MAJOR DEPRESSIVE DISORDER, SINGLE EPISODE, UNSPECIFIED: ICD-10-CM

## 2025-06-02 DIAGNOSIS — I48.91 UNSPECIFIED ATRIAL FIBRILLATION: ICD-10-CM

## 2025-06-02 DIAGNOSIS — M54.9 DORSALGIA, UNSPECIFIED: ICD-10-CM

## 2025-06-02 LAB
ALBUMIN SERPL ELPH-MCNC: 4 G/DL — SIGNIFICANT CHANGE UP (ref 3.3–5)
ALP SERPL-CCNC: 63 U/L — SIGNIFICANT CHANGE UP (ref 40–120)
ALT FLD-CCNC: 11 U/L — SIGNIFICANT CHANGE UP (ref 10–45)
ANION GAP SERPL CALC-SCNC: 12 MMOL/L — SIGNIFICANT CHANGE UP (ref 5–17)
APTT BLD: 44 SEC — HIGH (ref 26.1–36.8)
AST SERPL-CCNC: 21 U/L — SIGNIFICANT CHANGE UP (ref 10–40)
BASOPHILS # BLD AUTO: 0.03 K/UL — SIGNIFICANT CHANGE UP (ref 0–0.2)
BASOPHILS NFR BLD AUTO: 0.4 % — SIGNIFICANT CHANGE UP (ref 0–2)
BILIRUB SERPL-MCNC: 0.3 MG/DL — SIGNIFICANT CHANGE UP (ref 0.2–1.2)
BUN SERPL-MCNC: 13 MG/DL — SIGNIFICANT CHANGE UP (ref 7–23)
CALCIUM SERPL-MCNC: 9.2 MG/DL — SIGNIFICANT CHANGE UP (ref 8.4–10.5)
CHLORIDE SERPL-SCNC: 100 MMOL/L — SIGNIFICANT CHANGE UP (ref 96–108)
CO2 SERPL-SCNC: 25 MMOL/L — SIGNIFICANT CHANGE UP (ref 22–31)
CREAT SERPL-MCNC: 0.97 MG/DL — SIGNIFICANT CHANGE UP (ref 0.5–1.3)
EGFR: 64 ML/MIN/1.73M2 — SIGNIFICANT CHANGE UP
EGFR: 64 ML/MIN/1.73M2 — SIGNIFICANT CHANGE UP
EOSINOPHIL # BLD AUTO: 0.17 K/UL — SIGNIFICANT CHANGE UP (ref 0–0.5)
EOSINOPHIL NFR BLD AUTO: 2.3 % — SIGNIFICANT CHANGE UP (ref 0–6)
GLUCOSE SERPL-MCNC: 94 MG/DL — SIGNIFICANT CHANGE UP (ref 70–99)
HCT VFR BLD CALC: 30.8 % — LOW (ref 34.5–45)
HGB BLD-MCNC: 10.6 G/DL — LOW (ref 11.5–15.5)
IMM GRANULOCYTES NFR BLD AUTO: 0.1 % — SIGNIFICANT CHANGE UP (ref 0–0.9)
INR BLD: 1.35 — HIGH (ref 0.85–1.16)
LYMPHOCYTES # BLD AUTO: 2.11 K/UL — SIGNIFICANT CHANGE UP (ref 1–3.3)
LYMPHOCYTES # BLD AUTO: 28.2 % — SIGNIFICANT CHANGE UP (ref 13–44)
MAGNESIUM SERPL-MCNC: 1.5 MG/DL — LOW (ref 1.6–2.6)
MCHC RBC-ENTMCNC: 33.9 PG — SIGNIFICANT CHANGE UP (ref 27–34)
MCHC RBC-ENTMCNC: 34.4 G/DL — SIGNIFICANT CHANGE UP (ref 32–36)
MCV RBC AUTO: 98.4 FL — SIGNIFICANT CHANGE UP (ref 80–100)
MONOCYTES # BLD AUTO: 0.4 K/UL — SIGNIFICANT CHANGE UP (ref 0–0.9)
MONOCYTES NFR BLD AUTO: 5.3 % — SIGNIFICANT CHANGE UP (ref 2–14)
NEUTROPHILS # BLD AUTO: 4.77 K/UL — SIGNIFICANT CHANGE UP (ref 1.8–7.4)
NEUTROPHILS NFR BLD AUTO: 63.7 % — SIGNIFICANT CHANGE UP (ref 43–77)
NRBC BLD AUTO-RTO: 0 /100 WBCS — SIGNIFICANT CHANGE UP (ref 0–0)
PHOSPHATE SERPL-MCNC: 4 MG/DL — SIGNIFICANT CHANGE UP (ref 2.5–4.5)
PLATELET # BLD AUTO: 194 K/UL — SIGNIFICANT CHANGE UP (ref 150–400)
POTASSIUM SERPL-MCNC: 3.8 MMOL/L — SIGNIFICANT CHANGE UP (ref 3.5–5.3)
POTASSIUM SERPL-SCNC: 3.8 MMOL/L — SIGNIFICANT CHANGE UP (ref 3.5–5.3)
PROT SERPL-MCNC: 6.4 G/DL — SIGNIFICANT CHANGE UP (ref 6–8.3)
PROTHROM AB SERPL-ACNC: 15.5 SEC — HIGH (ref 9.9–13.4)
RBC # BLD: 3.13 M/UL — LOW (ref 3.8–5.2)
RBC # FLD: 11.9 % — SIGNIFICANT CHANGE UP (ref 10.3–14.5)
SODIUM SERPL-SCNC: 137 MMOL/L — SIGNIFICANT CHANGE UP (ref 135–145)
WBC # BLD: 7.49 K/UL — SIGNIFICANT CHANGE UP (ref 3.8–10.5)
WBC # FLD AUTO: 7.49 K/UL — SIGNIFICANT CHANGE UP (ref 3.8–10.5)

## 2025-06-02 PROCEDURE — 99223 1ST HOSP IP/OBS HIGH 75: CPT

## 2025-06-02 PROCEDURE — 95812 EEG 41-60 MINUTES: CPT | Mod: 26

## 2025-06-02 PROCEDURE — 93010 ELECTROCARDIOGRAM REPORT: CPT

## 2025-06-02 RX ORDER — LOSARTAN POTASSIUM 100 MG/1
1 TABLET, FILM COATED ORAL
Refills: 0 | DISCHARGE

## 2025-06-02 RX ORDER — LIDOCAINE HYDROCHLORIDE 20 MG/ML
1 JELLY TOPICAL EVERY 24 HOURS
Refills: 0 | Status: DISCONTINUED | OUTPATIENT
Start: 2025-06-02 | End: 2025-06-06

## 2025-06-02 RX ORDER — CYCLOBENZAPRINE HYDROCHLORIDE 15 MG/1
1 CAPSULE, EXTENDED RELEASE ORAL
Refills: 0 | DISCHARGE

## 2025-06-02 RX ORDER — LEVOTHYROXINE SODIUM 300 MCG
100 TABLET ORAL DAILY
Refills: 0 | Status: DISCONTINUED | OUTPATIENT
Start: 2025-06-02 | End: 2025-06-06

## 2025-06-02 RX ORDER — CITALOPRAM 20 MG/1
40 TABLET ORAL
Refills: 0 | Status: DISCONTINUED | OUTPATIENT
Start: 2025-06-02 | End: 2025-06-04

## 2025-06-02 RX ORDER — TRAZODONE HCL 100 MG
150 TABLET ORAL AT BEDTIME
Refills: 0 | Status: DISCONTINUED | OUTPATIENT
Start: 2025-06-02 | End: 2025-06-06

## 2025-06-02 RX ORDER — APIXABAN 2.5 MG/1
5 TABLET, FILM COATED ORAL
Refills: 0 | Status: DISCONTINUED | OUTPATIENT
Start: 2025-06-02 | End: 2025-06-06

## 2025-06-02 RX ORDER — ENOXAPARIN SODIUM 100 MG/ML
40 INJECTION SUBCUTANEOUS
Refills: 0 | Status: DISCONTINUED | OUTPATIENT
Start: 2025-06-02 | End: 2025-06-02

## 2025-06-02 RX ORDER — MAGNESIUM SULFATE 500 MG/ML
1 SYRINGE (ML) INJECTION ONCE
Refills: 0 | Status: COMPLETED | OUTPATIENT
Start: 2025-06-02 | End: 2025-06-02

## 2025-06-02 RX ORDER — GABAPENTIN 400 MG/1
2 CAPSULE ORAL
Refills: 0 | DISCHARGE

## 2025-06-02 RX ORDER — METOPROLOL SUCCINATE 50 MG/1
50 TABLET, EXTENDED RELEASE ORAL
Refills: 0 | Status: DISCONTINUED | OUTPATIENT
Start: 2025-06-02 | End: 2025-06-06

## 2025-06-02 RX ORDER — GABAPENTIN 400 MG/1
1 CAPSULE ORAL
Refills: 0 | DISCHARGE

## 2025-06-02 RX ORDER — LEVOTHYROXINE SODIUM 300 MCG
1 TABLET ORAL
Refills: 0 | DISCHARGE

## 2025-06-02 RX ORDER — GABAPENTIN 400 MG/1
400 CAPSULE ORAL
Refills: 0 | Status: DISCONTINUED | OUTPATIENT
Start: 2025-06-02 | End: 2025-06-06

## 2025-06-02 RX ORDER — TRAMADOL HYDROCHLORIDE 50 MG/1
50 TABLET, FILM COATED ORAL
Refills: 0 | Status: DISCONTINUED | OUTPATIENT
Start: 2025-06-02 | End: 2025-06-06

## 2025-06-02 RX ORDER — LOSARTAN POTASSIUM 100 MG/1
50 TABLET, FILM COATED ORAL
Refills: 0 | Status: DISCONTINUED | OUTPATIENT
Start: 2025-06-02 | End: 2025-06-06

## 2025-06-02 RX ORDER — TRAMADOL HYDROCHLORIDE 50 MG/1
50 TABLET, FILM COATED ORAL
Refills: 0 | Status: DISCONTINUED | OUTPATIENT
Start: 2025-06-02 | End: 2025-06-02

## 2025-06-02 RX ORDER — LORAZEPAM 4 MG/ML
2 VIAL (ML) INJECTION
Refills: 0 | Status: DISCONTINUED | OUTPATIENT
Start: 2025-06-02 | End: 2025-06-06

## 2025-06-02 RX ORDER — CALCITRIOL 0.5 UG/1
0.25 CAPSULE, GELATIN COATED ORAL
Refills: 0 | Status: DISCONTINUED | OUTPATIENT
Start: 2025-06-02 | End: 2025-06-06

## 2025-06-02 RX ORDER — GABAPENTIN 400 MG/1
800 CAPSULE ORAL
Refills: 0 | Status: DISCONTINUED | OUTPATIENT
Start: 2025-06-02 | End: 2025-06-06

## 2025-06-02 RX ORDER — CALCITRIOL 0.5 UG/1
1 CAPSULE, GELATIN COATED ORAL
Refills: 0 | DISCHARGE

## 2025-06-02 RX ORDER — OXYCODONE HYDROCHLORIDE 30 MG/1
10 TABLET ORAL ONCE
Refills: 0 | Status: DISCONTINUED | OUTPATIENT
Start: 2025-06-02 | End: 2025-06-02

## 2025-06-02 RX ORDER — APIXABAN 2.5 MG/1
1 TABLET, FILM COATED ORAL
Refills: 0 | DISCHARGE

## 2025-06-02 RX ORDER — ACETAMINOPHEN 500 MG/5ML
650 LIQUID (ML) ORAL EVERY 6 HOURS
Refills: 0 | Status: DISCONTINUED | OUTPATIENT
Start: 2025-06-02 | End: 2025-06-06

## 2025-06-02 RX ORDER — TRAZODONE HCL 100 MG
3 TABLET ORAL
Refills: 0 | DISCHARGE

## 2025-06-02 RX ORDER — METOPROLOL SUCCINATE 50 MG/1
1 TABLET, EXTENDED RELEASE ORAL
Refills: 0 | DISCHARGE

## 2025-06-02 RX ORDER — CYCLOBENZAPRINE HYDROCHLORIDE 15 MG/1
5 CAPSULE, EXTENDED RELEASE ORAL
Refills: 0 | Status: DISCONTINUED | OUTPATIENT
Start: 2025-06-02 | End: 2025-06-06

## 2025-06-02 RX ORDER — CYCLOBENZAPRINE HYDROCHLORIDE 15 MG/1
10 CAPSULE, EXTENDED RELEASE ORAL AT BEDTIME
Refills: 0 | Status: DISCONTINUED | OUTPATIENT
Start: 2025-06-02 | End: 2025-06-06

## 2025-06-02 RX ADMIN — GABAPENTIN 800 MILLIGRAM(S): 400 CAPSULE ORAL at 23:59

## 2025-06-02 RX ADMIN — APIXABAN 5 MILLIGRAM(S): 2.5 TABLET, FILM COATED ORAL at 23:56

## 2025-06-02 RX ADMIN — LIDOCAINE HYDROCHLORIDE 1 PATCH: 20 JELLY TOPICAL at 19:02

## 2025-06-02 RX ADMIN — GABAPENTIN 400 MILLIGRAM(S): 400 CAPSULE ORAL at 19:52

## 2025-06-02 RX ADMIN — CYCLOBENZAPRINE HYDROCHLORIDE 10 MILLIGRAM(S): 15 CAPSULE, EXTENDED RELEASE ORAL at 20:12

## 2025-06-02 RX ADMIN — CALCITRIOL 0.25 MICROGRAM(S): 0.5 CAPSULE, GELATIN COATED ORAL at 23:59

## 2025-06-02 RX ADMIN — LIDOCAINE HYDROCHLORIDE 1 PATCH: 20 JELLY TOPICAL at 19:03

## 2025-06-02 RX ADMIN — Medication 100 GRAM(S): at 19:01

## 2025-06-02 RX ADMIN — Medication 150 MILLIGRAM(S): at 23:56

## 2025-06-02 NOTE — PATIENT PROFILE ADULT - FUNCTIONAL ASSESSMENT - BASIC MOBILITY ASSESSMENT TYPE
Yasir was seen today for cough.  Diagnoses and all orders for this visit:  Acute otitis media, left (Primary)  -     cefTRIAXone (Rocephin) vial 1 g  Spasmodic cough  -     predniSONE (Deltasone) 20 mg tablet; Take 2 tablets (40 mg) by mouth once daily for 3 days.   Supportive care   Ceftriaxone x 2-3 doses in office due to treatment failure   Follow up in 3 days for recheck         It was a pleasure to see Yasir in the office today.  For questions, concerns, or scheduling please call the office at [phone]           Admission

## 2025-06-02 NOTE — H&P ADULT - PROBLEM SELECTOR PLAN 10
Nutrition & Prophylaxis:    F: None  E:  K>4, Mg>2, Phos >3  N: Regular diet  DVT PPx: SCDs  GI PPx: Protonix 40 mg PO QD  Dispo: 7 Andres  Code: FULL CODE

## 2025-06-02 NOTE — PATIENT PROFILE ADULT - FALL HARM RISK - HARM RISK INTERVENTIONS

## 2025-06-02 NOTE — H&P ADULT - PROBLEM SELECTOR PLAN 6
Hx of hypothyroidism after thyroid CA s/p thyroidectomy and parathyroidectomy in 2017    - Continue Synthroid 100mcg QD

## 2025-06-02 NOTE — H&P ADULT - PROBLEM SELECTOR PLAN 4
Hx of depression, but does not currently have a psychiatrist    - Consulted psychiatry, patient refused today but ok with evaluation tomorrow. Recommendations provided to continue trazadone 50 but patient takes 150mg QHS as well as to hold wellbutrin while awaiting formal evaluation.   - Hold Wellbutrin XL 150mg QHS started by her cardiologist a few months ago, and was informed to hold by psychiatrist   - Continue Buspar 5mg Q12hrs  - Continue trazadone 150mg QHS  - Continue Citalopram 40mg QD

## 2025-06-02 NOTE — H&P ADULT - NS ATTEND AMEND GEN_ALL_CORE FT
Pt of Dr. Mary with recurrent events, possibly seizures, though unusual that they tend to occur only when ambulatory.    Plan to record vEEG overnight then make decision to ambulate tmrw with vEEG on with supervision.

## 2025-06-02 NOTE — H&P ADULT - PROBLEM SELECTOR PLAN 7
Hx of Back, knee and shoulder pain    - Continue tramadol 50mg QD PRN  - Continue flexeril 5mg in AM and 10mg in PM PRN  - Continue lidocaine patch (1 for both knees and 1 on back - 3 total)
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
## 2025-06-02 NOTE — H&P ADULT - NSHPSOCIALHISTORY_GEN_ALL_CORE
woman, and lives alone, and has HHA for 4hrs/day. Former banker. Denies smoking, drinking or illicit drug use.

## 2025-06-02 NOTE — H&P ADULT - NSICDXPASTSURGICALHX_GEN_ALL_CORE_FT
PAST SURGICAL HISTORY:  Elective surgery for purposes other than treating health conditions right breast mass excision    Elective surgery for purposes other than treating health conditions back surgeries x 2    H/O parathyroidectomy     H/O thyroidectomy     History of left hip replacement     History of left shoulder replacement     History of lumbar laminectomy     History of total knee replacement 2009    History of total knee replacement right 2008    Other postprocedural status bilateral knees    Other postprocedural status bilateral rotator cuff repairs

## 2025-06-02 NOTE — H&P ADULT - NSHPREVIEWOFSYSTEMS_GEN_ALL_CORE
CONSTITUTIONAL:  No weight loss, fever, chills, weakness or fatigue.  HEENT:  Eyes:  No visual loss, blurred vision, double vision or yellow sclerae. Ears, Nose, Throat:  No hearing loss, sneezing, congestion, runny nose or sore throat.  SKIN:  No rash or itching.  CARDIOVASCULAR:  No chest pain, chest pressure or chest discomfort. No palpitations or edema.  RESPIRATORY:  No shortness of breath, cough or sputum.  GASTROINTESTINAL:  No anorexia, nausea, vomiting or diarrhea. No abdominal pain or blood.  GENITOURINARY: No Burning on urination.   NEUROLOGICAL:  see HPI  MUSCULOSKELETAL: +back, bilateral knee and bilateral shoulder pain.   HEMATOLOGIC:  No anemia, bleeding or bruising.  LYMPHATICS:  No enlarged nodes. No history of splenectomy.  PSYCHIATRIC:  Hx of depression  ENDOCRINOLOGIC:  No reports of sweating, cold or heat intolerance. No polyuria or polydipsia.  ALLERGIES:  No history of asthma, hives, eczema or rhinitis.

## 2025-06-02 NOTE — H&P ADULT - NSHPPHYSICALEXAM_GEN_ALL_CORE
General Adult Exam  GENERAL APPEARANCE: Well developed, well nourished, alert and cooperative, and appears to be in no acute distress.  EYES: PERRL, EOMI. vision is grossly intact.  EARS: External auditory canals and tympanic membranes clear, hearing grossly intact.  NOSE: No nasal discharge.  CARDIAC: Normal S1 and S2. No S3, S4 or murmurs. Rhythm is regular. There is no peripheral edema, cyanosis or pallor. Extremities are warm and well perfused. Capillary refill is less than 2 seconds. No carotid bruits.  LUNGS: Clear to auscultation and percussion without rales, rhonchi, wheezing or diminished breath sounds.  ABDOMEN: Positive bowel sounds. Soft, nondistended, nontender. No guarding or rebound. No masses.  MUSCULOSKELETAL: Normal muscular development. Normal gait.  BACK: Examination of the spine reveals normal gait and posture, no spinal deformity, symmetry of spinal muscles, without tenderness, decreased range of motion or muscular spasm.  EXTREMITIES: No significant deformity or joint abnormality. No edema. Peripheral pulses intact. No varicosities.  SKIN: Skin normal color, texture and turgor with no lesions or eruptions.    Neurological Exam:  Mental Status: Orientated to self, date and place.  Attention intact.  No dysarthria, aphasia or neglect.  Knowledge intact.  Registration intact.  Short and long term memory grossly intact.      Cranial Nerves: CN I - not tested.  PERRL, EOMI, VFF, no nystagmus or diplopia.  CN V1-3 intact to light touch.  No facial asymmetry.  Hearing intact to finger rub bilaterally.  Tongue, uvula and palate midline.  Sternocleidomastoid and Trapezius intact bilaterally.    Motor:   Tone: normal.                  Strength:     [] Upper extremity                      Delt       Bicep    Tricep                                                  R         5/5        5/5        5/5       5/5                                               L          5/5        5/5        5/5       5/5  [] Lower extremity                       HF          KE          KF        DF         PF                                               R        5/5        5/5        5/5       5/5       5/5                                               L         5/5        5/5       5/5       5/5        5/5  Pronator drift: none                 Dysmetria: None to finger-nose-finger or heel-shin-heel  No truncal ataxia.    Tremor: No resting, postural or action tremor.  No myoclonus.    Sensation: intact to light touch, pinprick, vibration and proprioception    Deep Tendon Reflexes: 1+ bilateral biceps, triceps, brachioradialis, knee and ankle  Toes flexor bilaterally    Gait: normal and stable. GENERAL APPEARANCE: Elderly looking woman who appears to be having some pain while ambulating.  EYES: PERRLA 2mm brisk, EOMI. vision is grossly intact.  EARS: External auditory canals and tympanic membranes clear, hearing grossly intact.  NOSE: No nasal discharge.  CARDIAC: Normal S1 and S2. No S3, S4 or murmurs. Rhythm is regular. There is no peripheral edema, cyanosis or pallor. Extremities are warm and well perfused. Capillary refill is less than 2 seconds. No carotid bruits.  LUNGS: Clear to auscultation   ABDOMEN: Positive bowel sounds. Soft, nondistended, nontender. No guarding or rebound. No masses.  MUSCULOSKELETAL: Very unsteady gait and she ambulates with a cane  EXTREMITIES: No significant deformity or joint abnormality. No edema. Peripheral pulses intact. No varicosities.  SKIN: Skin normal color, texture and turgor with no lesions or eruptions.    Neurological Exam:  Mental Status: Alert and orientated to self, date and place.  Attention intact.  No dysarthria, aphasia or neglect.  Knowledge intact.  Registration intact.  Short and long term memory grossly intact.      Cranial Nerves: CN I - not tested.  PERRLA 2mm brisk, EOMI, VFF, no nystagmus or diplopia.  CN V1-3 intact to light touch.  No facial asymmetry.  Hearing intact to finger rub bilaterally.  Tongue, uvula and palate midline.  Sternocleidomastoid and Trapezius intact bilaterally.    Motor:   Tone: normal.                  Strength:     [] Upper extremity                      Delt       Bicep    Tricep                                                  R         4/5        4/5        4/5       5/5                                               L          4/5        4/5        4/5       4/5  [] Lower extremity                       HF          KE          KF        DF         PF                                               R        4/5        4/5        4/5       4/5       4/5                                               L         3/5        3/5       3/5       3/5        3/5  Pronator drift: none                 Dysmetria: None to finger-nose-finger   Tremor: No resting, postural or action tremor.  No myoclonus.  Sensation: Diminished on the 3 digits Middle finger to pinky on both hands d/t nerve entrapped at wrist. B/L LE numbness from knee down and worse on L.   Deep Tendon Reflexes: 1+ bilateral biceps, triceps, brachioradialis, knee and ankle  Toes mute  Gait: Unsteady gait with cane. Required contact guard while ambulating.

## 2025-06-02 NOTE — H&P ADULT - ASSESSMENT
64 year-old-right-handed-female w/ PMH of remote seizures (40yrs ago), vasovagal syncope, Thyroid CA s/p parathyroidectomy and thyroidectomy 2017 and subsequent hypothyroidism, OA, tremor, vitamin D deficiency, HTN, spinal stenosis, osteoporosis, anxious, Afib s/p cardiac ablation 2022, B/L knee surgery, L shoulder replacement, breast lumpectomy, B/L hip replacements, ACDF C4 - 5, C5 - 6, C6 - 7 and lumbar laminectomy L3-4 and L4 - 5 who presents for an elective admission to characterize events that started a year ago while undergoing vEEG monitoring.

## 2025-06-02 NOTE — H&P ADULT - NSICDXFAMILYHX_GEN_ALL_CORE_FT
FAMILY HISTORY:  Father  Still living? No  Family history of rectal cancer, Age at diagnosis: 41-50

## 2025-06-02 NOTE — H&P ADULT - PROBLEM SELECTOR PLAN 3
Hx of HTN    - Continue HCTZ 12.5mg QD  - Continue Metoprolol XL 50mg QD  - Continue Losartan 50mg QD
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
## 2025-06-03 DIAGNOSIS — F43.20 ADJUSTMENT DISORDER, UNSPECIFIED: ICD-10-CM

## 2025-06-03 LAB
24R-OH-CALCIDIOL SERPL-MCNC: 43.6 NG/ML — SIGNIFICANT CHANGE UP
ANION GAP SERPL CALC-SCNC: 8 MMOL/L — SIGNIFICANT CHANGE UP (ref 5–17)
BUN SERPL-MCNC: 11 MG/DL — SIGNIFICANT CHANGE UP (ref 7–23)
CALCIUM SERPL-MCNC: 8.4 MG/DL — SIGNIFICANT CHANGE UP (ref 8.4–10.5)
CHLORIDE SERPL-SCNC: 105 MMOL/L — SIGNIFICANT CHANGE UP (ref 96–108)
CO2 SERPL-SCNC: 31 MMOL/L — SIGNIFICANT CHANGE UP (ref 22–31)
CREAT SERPL-MCNC: 1 MG/DL — SIGNIFICANT CHANGE UP (ref 0.5–1.3)
EGFR: 62 ML/MIN/1.73M2 — SIGNIFICANT CHANGE UP
EGFR: 62 ML/MIN/1.73M2 — SIGNIFICANT CHANGE UP
GLUCOSE SERPL-MCNC: 81 MG/DL — SIGNIFICANT CHANGE UP (ref 70–99)
HCT VFR BLD CALC: 33.5 % — LOW (ref 34.5–45)
HGB BLD-MCNC: 11.3 G/DL — LOW (ref 11.5–15.5)
MAGNESIUM SERPL-MCNC: 1.6 MG/DL — SIGNIFICANT CHANGE UP (ref 1.6–2.6)
MCHC RBC-ENTMCNC: 33.7 G/DL — SIGNIFICANT CHANGE UP (ref 32–36)
MCHC RBC-ENTMCNC: 33.9 PG — SIGNIFICANT CHANGE UP (ref 27–34)
MCV RBC AUTO: 100.6 FL — HIGH (ref 80–100)
NRBC BLD AUTO-RTO: 0 /100 WBCS — SIGNIFICANT CHANGE UP (ref 0–0)
PHOSPHATE SERPL-MCNC: 4.1 MG/DL — SIGNIFICANT CHANGE UP (ref 2.5–4.5)
PLATELET # BLD AUTO: 181 K/UL — SIGNIFICANT CHANGE UP (ref 150–400)
POTASSIUM SERPL-MCNC: 3.9 MMOL/L — SIGNIFICANT CHANGE UP (ref 3.5–5.3)
POTASSIUM SERPL-SCNC: 3.9 MMOL/L — SIGNIFICANT CHANGE UP (ref 3.5–5.3)
RBC # BLD: 3.33 M/UL — LOW (ref 3.8–5.2)
RBC # FLD: 12.1 % — SIGNIFICANT CHANGE UP (ref 10.3–14.5)
SODIUM SERPL-SCNC: 144 MMOL/L — SIGNIFICANT CHANGE UP (ref 135–145)
TSH SERPL-MCNC: 3.88 UIU/ML — SIGNIFICANT CHANGE UP (ref 0.27–4.2)
WBC # BLD: 5.12 K/UL — SIGNIFICANT CHANGE UP (ref 3.8–10.5)
WBC # FLD AUTO: 5.12 K/UL — SIGNIFICANT CHANGE UP (ref 3.8–10.5)

## 2025-06-03 PROCEDURE — 99223 1ST HOSP IP/OBS HIGH 75: CPT

## 2025-06-03 PROCEDURE — 99232 SBSQ HOSP IP/OBS MODERATE 35: CPT

## 2025-06-03 RX ORDER — MAGNESIUM SULFATE 500 MG/ML
4 SYRINGE (ML) INJECTION ONCE
Refills: 0 | Status: DISCONTINUED | OUTPATIENT
Start: 2025-06-03 | End: 2025-06-04

## 2025-06-03 RX ORDER — MAGNESIUM SULFATE 500 MG/ML
2 SYRINGE (ML) INJECTION ONCE
Refills: 0 | Status: DISCONTINUED | OUTPATIENT
Start: 2025-06-03 | End: 2025-06-03

## 2025-06-03 RX ORDER — HYDROCHLOROTHIAZIDE 50 MG/1
1 TABLET ORAL
Refills: 0 | DISCHARGE

## 2025-06-03 RX ORDER — OXYCODONE HYDROCHLORIDE 30 MG/1
10 TABLET ORAL EVERY 6 HOURS
Refills: 0 | Status: DISCONTINUED | OUTPATIENT
Start: 2025-06-03 | End: 2025-06-06

## 2025-06-03 RX ORDER — DIPHENHYDRAMINE HCL 12.5MG/5ML
12.5 ELIXIR ORAL ONCE
Refills: 0 | Status: COMPLETED | OUTPATIENT
Start: 2025-06-03 | End: 2025-06-03

## 2025-06-03 RX ADMIN — CALCITRIOL 0.25 MICROGRAM(S): 0.5 CAPSULE, GELATIN COATED ORAL at 10:19

## 2025-06-03 RX ADMIN — LIDOCAINE HYDROCHLORIDE 1 PATCH: 20 JELLY TOPICAL at 07:09

## 2025-06-03 RX ADMIN — OXYCODONE HYDROCHLORIDE 10 MILLIGRAM(S): 30 TABLET ORAL at 21:02

## 2025-06-03 RX ADMIN — OXYCODONE HYDROCHLORIDE 10 MILLIGRAM(S): 30 TABLET ORAL at 15:10

## 2025-06-03 RX ADMIN — CITALOPRAM 40 MILLIGRAM(S): 20 TABLET ORAL at 10:17

## 2025-06-03 RX ADMIN — Medication 100 MICROGRAM(S): at 06:12

## 2025-06-03 RX ADMIN — LIDOCAINE HYDROCHLORIDE 1 PATCH: 20 JELLY TOPICAL at 18:03

## 2025-06-03 RX ADMIN — APIXABAN 5 MILLIGRAM(S): 2.5 TABLET, FILM COATED ORAL at 21:02

## 2025-06-03 RX ADMIN — LIDOCAINE HYDROCHLORIDE 1 PATCH: 20 JELLY TOPICAL at 17:54

## 2025-06-03 RX ADMIN — GABAPENTIN 400 MILLIGRAM(S): 400 CAPSULE ORAL at 10:19

## 2025-06-03 RX ADMIN — Medication 650 MILLIGRAM(S): at 15:12

## 2025-06-03 RX ADMIN — LOSARTAN POTASSIUM 50 MILLIGRAM(S): 100 TABLET, FILM COATED ORAL at 10:19

## 2025-06-03 RX ADMIN — Medication 150 MILLIGRAM(S): at 22:12

## 2025-06-03 RX ADMIN — APIXABAN 5 MILLIGRAM(S): 2.5 TABLET, FILM COATED ORAL at 10:18

## 2025-06-03 RX ADMIN — Medication 12.5 MILLIGRAM(S): at 18:02

## 2025-06-03 RX ADMIN — METOPROLOL SUCCINATE 50 MILLIGRAM(S): 50 TABLET, EXTENDED RELEASE ORAL at 10:18

## 2025-06-03 RX ADMIN — Medication 650 MILLIGRAM(S): at 15:10

## 2025-06-03 RX ADMIN — GABAPENTIN 800 MILLIGRAM(S): 400 CAPSULE ORAL at 21:03

## 2025-06-03 RX ADMIN — Medication 650 MILLIGRAM(S): at 22:02

## 2025-06-03 RX ADMIN — Medication 500 MILLIGRAM(S): at 12:03

## 2025-06-03 RX ADMIN — LIDOCAINE HYDROCHLORIDE 1 PATCH: 20 JELLY TOPICAL at 17:55

## 2025-06-03 RX ADMIN — Medication 40 MILLIGRAM(S): at 06:13

## 2025-06-03 RX ADMIN — OXYCODONE HYDROCHLORIDE 10 MILLIGRAM(S): 30 TABLET ORAL at 22:02

## 2025-06-03 RX ADMIN — OXYCODONE HYDROCHLORIDE 10 MILLIGRAM(S): 30 TABLET ORAL at 15:12

## 2025-06-03 RX ADMIN — CALCITRIOL 0.25 MICROGRAM(S): 0.5 CAPSULE, GELATIN COATED ORAL at 21:38

## 2025-06-03 RX ADMIN — Medication 650 MILLIGRAM(S): at 21:02

## 2025-06-03 NOTE — PROGRESS NOTE ADULT - PROBLEM SELECTOR PLAN 3
Hx of HTN    - Continue HCTZ 12.5mg QD  - Continue Metoprolol XL 50mg QD  - Continue Losartan 50mg QD Hx of HTN    - Continue Metoprolol XL 50mg QD  - Continue Losartan 50mg QD  - Discontinued HCTZ as it was recently stopped as an outpatient
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
## 2025-06-03 NOTE — BH CONSULTATION LIAISON ASSESSMENT NOTE - OTHER PAST PSYCHIATRIC HISTORY (INCLUDE DETAILS REGARDING ONSET, COURSE OF ILLNESS, INPATIENT/OUTPATIENT TREATMENT)
Reports dx of depression and insomnia, with medications prescribed by PCP for ~2 years. No history of psychiatric admissions or suicide attempts
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
## 2025-06-03 NOTE — BH CONSULTATION LIAISON ASSESSMENT NOTE - CURRENT MEDICATION
MEDICATIONS  (STANDING):  apixaban 5 milliGRAM(s) Oral <User Schedule>  ascorbic acid 500 milliGRAM(s) Oral daily  calcitriol   Capsule 0.25 MICROGram(s) Oral <User Schedule>  citalopram 40 milliGRAM(s) Oral <User Schedule>  gabapentin 400 milliGRAM(s) Oral <User Schedule>  gabapentin 800 milliGRAM(s) Oral <User Schedule>  levothyroxine 100 MICROGram(s) Oral daily  lidocaine   4% Patch 1 Patch Transdermal every 24 hours  lidocaine   4% Patch 1 Patch Transdermal every 24 hours  lidocaine   4% Patch 1 Patch Transdermal every 24 hours  losartan 50 milliGRAM(s) Oral <User Schedule>  magnesium sulfate  IVPB 4 Gram(s) IV Intermittent once  metoprolol succinate ER 50 milliGRAM(s) Oral <User Schedule>  pantoprazole    Tablet 40 milliGRAM(s) Oral before breakfast  traZODone 150 milliGRAM(s) Oral at bedtime    MEDICATIONS  (PRN):  acetaminophen     Tablet .. 650 milliGRAM(s) Oral every 6 hours PRN Temp greater or equal to 38.5C (101.3F), Mild Pain (1 - 3)  cyclobenzaprine 10 milliGRAM(s) Oral at bedtime PRN Muscle Spasm  cyclobenzaprine 5 milliGRAM(s) Oral <User Schedule> PRN Muscle Spasm  diphenhydrAMINE 12.5 milliGRAM(s) Oral once PRN Rash and/or Itching  LORazepam   Injectable 2 milliGRAM(s) IV Push every 2 hours PRN Seizure Activity  oxyCODONE    IR 10 milliGRAM(s) Oral every 6 hours PRN Severe Pain (7 - 10)  traMADol 50 milliGRAM(s) Oral two times a day PRN Moderate Pain (4 - 6)  zolpidem 5 milliGRAM(s) Oral at bedtime PRN Insomnia  zolpidem 5 milliGRAM(s) Oral at bedtime PRN Insomnia

## 2025-06-03 NOTE — CHART NOTE - NSCHARTNOTEFT_GEN_A_CORE
Brief Psych CL Note:  Called to consult on patient with history of depression, PCP prescribing Wellbutrin, possible other PCP prescribing trazodone. Patient admitted to EMU for concern for possible seizures (remote history of possible seizures).  H/P is currently being written up.    Patient seen this afternoon. Upon introducing ourselves, she acknowledges that she is depressed with recent familial losses, however, she is asking if CL can return tomorrow as she wants time to settle into hospital.  She denies SI intent or plan. States that she started wellbutrin this past Friday and that she uses trazodone 50mg for sleep.  Explained to patient that will hold wellbutrin for now. Agree with patient that CL team to return tomorrow.     ISTOP checked-  zolpidem 10mg dispensed on 5/24/25 Dr. Conti  tramadol 50mg dispensed on 5/22/25 Dr. Conti  oxycodone 10mg dispensed on 5/22/25 Dr. Lorenzo    EKG reviewed from 6/2/25- , HR 56    Plan:  [] respect patient's autonomy, Psych CL  to return tomorrow AM for full consult       - please do not hesitate to call back sooner if anything emergent/urgent arises  [] hold/DC wellbutrin- given concern for seizures- awaiting workup  [] can continue trazodone 50mg HS prn for sleep  [] hold ambien for now  [] defer to primary team regarding pain medication regimen- of note, tramadol is serotonergic so be mindful of serotonergic load if on other serotonergic agents      Please call with questions
CL attempted to see this patient this morning, however the patient refused.
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
## 2025-06-03 NOTE — BH CONSULTATION LIAISON ASSESSMENT NOTE - NSBHCHARTREVIEWVS_PSY_A_CORE FT
Vital Signs Last 24 Hrs  T(C): 37.2 (03 Jun 2025 12:25), Max: 37.2 (03 Jun 2025 12:25)  T(F): 99 (03 Jun 2025 12:25), Max: 99 (03 Jun 2025 12:25)  HR: 61 (03 Jun 2025 12:25) (54 - 61)  BP: 121/75 (03 Jun 2025 12:25) (121/75 - 142/77)  BP(mean): 98 (03 Jun 2025 06:10) (98 - 98)  RR: 19 (03 Jun 2025 12:25) (18 - 19)  SpO2: 95% (03 Jun 2025 12:25) (95% - 97%)    Parameters below as of 03 Jun 2025 12:25  Patient On (Oxygen Delivery Method): room air

## 2025-06-03 NOTE — BH CONSULTATION LIAISON ASSESSMENT NOTE - RISK ASSESSMENT
LOW acute suicide and violence risk. No current or past SI/HI. no h/o SA or violence, currently forward thinking, treatment seeking, with residential stability, family support, intelligence  static risk factors include h/o depression, h/o chronic pain, age  modifiable risk factors include lack of engagement in psychiatric care, acute illness leading to hospitalization
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
## 2025-06-03 NOTE — BH CONSULTATION LIAISON ASSESSMENT NOTE - DIFFERENTIAL
unspecified depression, consider MDD recurrent episode v. complicated grief unspecified depression, likely MDD recurrent episode, also consider adjustment d/o, cluster B personality traits  recent grief, consider complicated grief  r/o opioid or hypnotic misuse unspecified depression, likely MDD recurrent episode, also consider adjustment d/o, cluster B personality traits  recent grief, consider complicated grief  r/o opioid or hypnotic misuse  r/o minor neurocognitive deficits

## 2025-06-03 NOTE — BH CONSULTATION LIAISON ASSESSMENT NOTE - NSBHCHARTREVIEWINVESTIGATE_PSY_A_CORE FT
EKG 6/2    Ventricular Rate 56 BPM    Atrial Rate 56 BPM    P-R Interval 224 ms    QRS Duration 80 ms    Q-T Interval 434 ms    QTC Calculation(Bazett) 418 ms    P Axis 72 degrees    R Axis 21 degrees    T Axis 23 degrees    Diagnosis Line Sinus bradycardia with 1st degree AV block  Low voltage QRS  Borderline ECG  Confirmed by Thierno Khalil ([id number]) on 6/2/2025 12:11:29 PM

## 2025-06-03 NOTE — BH CONSULTATION LIAISON ASSESSMENT NOTE - NSBHCONSULTFOLLOWAFTERCARE_PSY_A_CORE FT
Encourage to establish outpatient psychiatric care. Please provide referral information for:    •	[facility]  o	[facility] [facility] [address]  o	[phone]     •	[facility]  o	[email or web address]  o	[address]  [address]  [phone]
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
## 2025-06-03 NOTE — BH CONSULTATION LIAISON ASSESSMENT NOTE - OTHER
sitting up in bed, EEG monitoring in place pt reports occasional tremor and involuntary jerks in her upper extremities - not observed on exam today stable posture in bed; gait not assessed sadness, focused on death of sister, physical debility

## 2025-06-03 NOTE — BH CONSULTATION LIAISON ASSESSMENT NOTE - NSBHCHARTREVIEWLAB_PSY_A_CORE FT
No utox seen                          11.3   5.12  )-----------( 181      ( 03 Jun 2025 05:30 )             33.5     06-03    144  |  105  |  11  ----------------------------<  81  3.9   |  31  |  1.00    Ca    8.4      03 Jun 2025 05:30  Phos  4.1     06-03  Mg     1.6     06-03    TPro  6.4  /  Alb  4.0  /  TBili  0.3  /  DBili  x   /  AST  21  /  ALT  11  /  AlkPhos  63  06-02    TSH WNL

## 2025-06-04 ENCOUNTER — TRANSCRIPTION ENCOUNTER (OUTPATIENT)
Age: 65
End: 2025-06-04

## 2025-06-04 DIAGNOSIS — R74.8 ABNORMAL LEVELS OF OTHER SERUM ENZYMES: ICD-10-CM

## 2025-06-04 LAB
ALBUMIN SERPL ELPH-MCNC: 4 G/DL — SIGNIFICANT CHANGE UP (ref 3.3–5)
ALBUMIN SERPL ELPH-MCNC: 4.4 G/DL — SIGNIFICANT CHANGE UP (ref 3.3–5)
ALP SERPL-CCNC: 163 U/L — HIGH (ref 40–120)
ALP SERPL-CCNC: 97 U/L — SIGNIFICANT CHANGE UP (ref 40–120)
ALT FLD-CCNC: 58 U/L — HIGH (ref 10–45)
ALT FLD-CCNC: 74 U/L — HIGH (ref 10–45)
ANION GAP SERPL CALC-SCNC: 11 MMOL/L — SIGNIFICANT CHANGE UP (ref 5–17)
ANION GAP SERPL CALC-SCNC: 9 MMOL/L — SIGNIFICANT CHANGE UP (ref 5–17)
APAP SERPL-MCNC: <5 UG/ML — LOW (ref 10–30)
AST SERPL-CCNC: 104 U/L — HIGH (ref 10–40)
AST SERPL-CCNC: 146 U/L — HIGH (ref 10–40)
BILIRUB SERPL-MCNC: 0.4 MG/DL — SIGNIFICANT CHANGE UP (ref 0.2–1.2)
BILIRUB SERPL-MCNC: 0.6 MG/DL — SIGNIFICANT CHANGE UP (ref 0.2–1.2)
BUN SERPL-MCNC: 10 MG/DL — SIGNIFICANT CHANGE UP (ref 7–23)
BUN SERPL-MCNC: 11 MG/DL — SIGNIFICANT CHANGE UP (ref 7–23)
CALCIUM SERPL-MCNC: 8.9 MG/DL — SIGNIFICANT CHANGE UP (ref 8.4–10.5)
CALCIUM SERPL-MCNC: 9.5 MG/DL — SIGNIFICANT CHANGE UP (ref 8.4–10.5)
CHLORIDE SERPL-SCNC: 103 MMOL/L — SIGNIFICANT CHANGE UP (ref 96–108)
CHLORIDE SERPL-SCNC: 99 MMOL/L — SIGNIFICANT CHANGE UP (ref 96–108)
CO2 SERPL-SCNC: 29 MMOL/L — SIGNIFICANT CHANGE UP (ref 22–31)
CO2 SERPL-SCNC: 31 MMOL/L — SIGNIFICANT CHANGE UP (ref 22–31)
CREAT SERPL-MCNC: 0.94 MG/DL — SIGNIFICANT CHANGE UP (ref 0.5–1.3)
CREAT SERPL-MCNC: 0.95 MG/DL — SIGNIFICANT CHANGE UP (ref 0.5–1.3)
EGFR: 66 ML/MIN/1.73M2 — SIGNIFICANT CHANGE UP
EGFR: 66 ML/MIN/1.73M2 — SIGNIFICANT CHANGE UP
EGFR: 67 ML/MIN/1.73M2 — SIGNIFICANT CHANGE UP
EGFR: 67 ML/MIN/1.73M2 — SIGNIFICANT CHANGE UP
GLUCOSE SERPL-MCNC: 91 MG/DL — SIGNIFICANT CHANGE UP (ref 70–99)
GLUCOSE SERPL-MCNC: 98 MG/DL — SIGNIFICANT CHANGE UP (ref 70–99)
HAV IGM SER-ACNC: SIGNIFICANT CHANGE UP
HBV CORE AB SER-ACNC: SIGNIFICANT CHANGE UP
HBV CORE IGM SER-ACNC: SIGNIFICANT CHANGE UP
HBV SURFACE AB SER-ACNC: ABNORMAL
HBV SURFACE AG SER-ACNC: SIGNIFICANT CHANGE UP
HCT VFR BLD CALC: 36.5 % — SIGNIFICANT CHANGE UP (ref 34.5–45)
HCV AB S/CO SERPL IA: 0.05 S/CO — SIGNIFICANT CHANGE UP
HCV AB SERPL-IMP: SIGNIFICANT CHANGE UP
HGB BLD-MCNC: 12 G/DL — SIGNIFICANT CHANGE UP (ref 11.5–15.5)
MAGNESIUM SERPL-MCNC: 1.7 MG/DL — SIGNIFICANT CHANGE UP (ref 1.6–2.6)
MCHC RBC-ENTMCNC: 32.9 G/DL — SIGNIFICANT CHANGE UP (ref 32–36)
MCHC RBC-ENTMCNC: 33.7 PG — SIGNIFICANT CHANGE UP (ref 27–34)
MCV RBC AUTO: 102.5 FL — HIGH (ref 80–100)
NRBC BLD AUTO-RTO: 0 /100 WBCS — SIGNIFICANT CHANGE UP (ref 0–0)
PHOSPHATE SERPL-MCNC: 3.8 MG/DL — SIGNIFICANT CHANGE UP (ref 2.5–4.5)
PLATELET # BLD AUTO: 187 K/UL — SIGNIFICANT CHANGE UP (ref 150–400)
POTASSIUM SERPL-MCNC: 4.4 MMOL/L — SIGNIFICANT CHANGE UP (ref 3.5–5.3)
POTASSIUM SERPL-MCNC: 4.5 MMOL/L — SIGNIFICANT CHANGE UP (ref 3.5–5.3)
POTASSIUM SERPL-SCNC: 4.4 MMOL/L — SIGNIFICANT CHANGE UP (ref 3.5–5.3)
POTASSIUM SERPL-SCNC: 4.5 MMOL/L — SIGNIFICANT CHANGE UP (ref 3.5–5.3)
PROT SERPL-MCNC: 6.4 G/DL — SIGNIFICANT CHANGE UP (ref 6–8.3)
PROT SERPL-MCNC: 6.8 G/DL — SIGNIFICANT CHANGE UP (ref 6–8.3)
RBC # BLD: 3.56 M/UL — LOW (ref 3.8–5.2)
RBC # FLD: 12 % — SIGNIFICANT CHANGE UP (ref 10.3–14.5)
SODIUM SERPL-SCNC: 139 MMOL/L — SIGNIFICANT CHANGE UP (ref 135–145)
SODIUM SERPL-SCNC: 143 MMOL/L — SIGNIFICANT CHANGE UP (ref 135–145)
VIT B12 SERPL-MCNC: 647 PG/ML — SIGNIFICANT CHANGE UP (ref 232–1245)
WBC # BLD: 5.06 K/UL — SIGNIFICANT CHANGE UP (ref 3.8–10.5)
WBC # FLD AUTO: 5.06 K/UL — SIGNIFICANT CHANGE UP (ref 3.8–10.5)

## 2025-06-04 PROCEDURE — 99232 SBSQ HOSP IP/OBS MODERATE 35: CPT

## 2025-06-04 PROCEDURE — 99233 SBSQ HOSP IP/OBS HIGH 50: CPT

## 2025-06-04 RX ORDER — SERTRALINE 100 MG/1
50 TABLET, FILM COATED ORAL
Refills: 0 | Status: DISCONTINUED | OUTPATIENT
Start: 2025-06-04 | End: 2025-06-04

## 2025-06-04 RX ORDER — DIPHENHYDRAMINE HCL 12.5MG/5ML
25 ELIXIR ORAL EVERY 6 HOURS
Refills: 0 | Status: DISCONTINUED | OUTPATIENT
Start: 2025-06-04 | End: 2025-06-06

## 2025-06-04 RX ORDER — OXYCODONE HYDROCHLORIDE 30 MG/1
1 TABLET ORAL
Qty: 0 | Refills: 0 | DISCHARGE
Start: 2025-06-04

## 2025-06-04 RX ORDER — SERTRALINE 100 MG/1
1 TABLET, FILM COATED ORAL
Qty: 30 | Refills: 0
Start: 2025-06-04 | End: 2025-07-03

## 2025-06-04 RX ORDER — TRAMADOL HYDROCHLORIDE 50 MG/1
1 TABLET, FILM COATED ORAL
Qty: 0 | Refills: 0 | DISCHARGE
Start: 2025-06-04

## 2025-06-04 RX ORDER — CALCIUM CARBONATE/VITAMIN D3 500MG-5MCG
1 TABLET ORAL
Refills: 0 | Status: DISCONTINUED | OUTPATIENT
Start: 2025-06-04 | End: 2025-06-04

## 2025-06-04 RX ORDER — IBUPROFEN 200 MG
400 TABLET ORAL ONCE
Refills: 0 | Status: DISCONTINUED | OUTPATIENT
Start: 2025-06-04 | End: 2025-06-04

## 2025-06-04 RX ORDER — CITALOPRAM 20 MG/1
40 TABLET ORAL DAILY
Refills: 0 | Status: DISCONTINUED | OUTPATIENT
Start: 2025-06-04 | End: 2025-06-05

## 2025-06-04 RX ORDER — MAGNESIUM SULFATE 500 MG/ML
2 SYRINGE (ML) INJECTION ONCE
Refills: 0 | Status: COMPLETED | OUTPATIENT
Start: 2025-06-04 | End: 2025-06-04

## 2025-06-04 RX ORDER — LIDOCAINE HYDROCHLORIDE 20 MG/ML
3 JELLY TOPICAL
Qty: 0 | Refills: 0 | DISCHARGE
Start: 2025-06-04

## 2025-06-04 RX ADMIN — LIDOCAINE HYDROCHLORIDE 1 PATCH: 20 JELLY TOPICAL at 17:06

## 2025-06-04 RX ADMIN — OXYCODONE HYDROCHLORIDE 10 MILLIGRAM(S): 30 TABLET ORAL at 06:53

## 2025-06-04 RX ADMIN — Medication 650 MILLIGRAM(S): at 06:52

## 2025-06-04 RX ADMIN — LIDOCAINE HYDROCHLORIDE 1 PATCH: 20 JELLY TOPICAL at 06:50

## 2025-06-04 RX ADMIN — Medication 650 MILLIGRAM(S): at 07:52

## 2025-06-04 RX ADMIN — Medication 40 MILLIGRAM(S): at 06:53

## 2025-06-04 RX ADMIN — TRAMADOL HYDROCHLORIDE 50 MILLIGRAM(S): 50 TABLET, FILM COATED ORAL at 13:00

## 2025-06-04 RX ADMIN — Medication 25 GRAM(S): at 10:11

## 2025-06-04 RX ADMIN — GABAPENTIN 800 MILLIGRAM(S): 400 CAPSULE ORAL at 22:24

## 2025-06-04 RX ADMIN — OXYCODONE HYDROCHLORIDE 10 MILLIGRAM(S): 30 TABLET ORAL at 07:52

## 2025-06-04 RX ADMIN — METOPROLOL SUCCINATE 50 MILLIGRAM(S): 50 TABLET, EXTENDED RELEASE ORAL at 10:11

## 2025-06-04 RX ADMIN — CALCITRIOL 0.25 MICROGRAM(S): 0.5 CAPSULE, GELATIN COATED ORAL at 22:24

## 2025-06-04 RX ADMIN — LIDOCAINE HYDROCHLORIDE 1 PATCH: 20 JELLY TOPICAL at 17:07

## 2025-06-04 RX ADMIN — OXYCODONE HYDROCHLORIDE 10 MILLIGRAM(S): 30 TABLET ORAL at 15:00

## 2025-06-04 RX ADMIN — SERTRALINE 50 MILLIGRAM(S): 100 TABLET, FILM COATED ORAL at 10:14

## 2025-06-04 RX ADMIN — TRAMADOL HYDROCHLORIDE 50 MILLIGRAM(S): 50 TABLET, FILM COATED ORAL at 18:03

## 2025-06-04 RX ADMIN — CALCITRIOL 0.25 MICROGRAM(S): 0.5 CAPSULE, GELATIN COATED ORAL at 10:14

## 2025-06-04 RX ADMIN — LIDOCAINE HYDROCHLORIDE 1 PATCH: 20 JELLY TOPICAL at 18:03

## 2025-06-04 RX ADMIN — Medication 100 MICROGRAM(S): at 06:53

## 2025-06-04 RX ADMIN — APIXABAN 5 MILLIGRAM(S): 2.5 TABLET, FILM COATED ORAL at 22:23

## 2025-06-04 RX ADMIN — Medication 500 MILLIGRAM(S): at 11:15

## 2025-06-04 RX ADMIN — CYCLOBENZAPRINE HYDROCHLORIDE 5 MILLIGRAM(S): 15 CAPSULE, EXTENDED RELEASE ORAL at 10:14

## 2025-06-04 RX ADMIN — APIXABAN 5 MILLIGRAM(S): 2.5 TABLET, FILM COATED ORAL at 10:14

## 2025-06-04 RX ADMIN — GABAPENTIN 400 MILLIGRAM(S): 400 CAPSULE ORAL at 10:11

## 2025-06-04 RX ADMIN — TRAMADOL HYDROCHLORIDE 50 MILLIGRAM(S): 50 TABLET, FILM COATED ORAL at 12:05

## 2025-06-04 RX ADMIN — OXYCODONE HYDROCHLORIDE 10 MILLIGRAM(S): 30 TABLET ORAL at 13:30

## 2025-06-04 RX ADMIN — Medication 1 TABLET(S): at 11:56

## 2025-06-04 RX ADMIN — TRAMADOL HYDROCHLORIDE 50 MILLIGRAM(S): 50 TABLET, FILM COATED ORAL at 17:06

## 2025-06-04 RX ADMIN — LOSARTAN POTASSIUM 50 MILLIGRAM(S): 100 TABLET, FILM COATED ORAL at 10:14

## 2025-06-04 RX ADMIN — Medication 150 MILLIGRAM(S): at 22:23

## 2025-06-04 RX ADMIN — LIDOCAINE HYDROCHLORIDE 1 PATCH: 20 JELLY TOPICAL at 17:05

## 2025-06-04 NOTE — DISCHARGE NOTE NURSING/CASE MANAGEMENT/SOCIAL WORK - FINANCIAL ASSISTANCE
[facility] provides services at a reduced cost to those who are determined to be eligible through [facility]’s financial assistance program. Information regarding [facility]’s financial assistance program can be found by going to [email or web address] or by calling [phone]. may also call Recovery Room (PACU) 24/7 @ [phone]/[facility]

## 2025-06-04 NOTE — DISCHARGE NOTE NURSING/CASE MANAGEMENT/SOCIAL WORK - PATIENT PORTAL LINK FT
You can access the FollowMyHealth Patient Portal offered by [facility] by registering at the following website: [email or web address]. By joining [employer]’s FollowMyHealth portal, you will also be able to view your health information using other applications (apps) compatible with our system.

## 2025-06-04 NOTE — PHYSICAL THERAPY INITIAL EVALUATION ADULT - GENERAL OBSERVATIONS, REHAB EVAL
PT IE completed. Chart reviewed. Pt received semi-supine, NAD, +IV heplock, +vEEG. NIDIA Mcfadden cleared pt for PT.

## 2025-06-04 NOTE — DISCHARGE NOTE PROVIDER - NSDCACTIVITY_GEN_ALL_CORE
Return to Work/School allowed/Do not drive or operate machinery/Walking - Indoors allowed/Walking - Outdoors allowed

## 2025-06-04 NOTE — BH CONSULTATION LIAISON PROGRESS NOTE - NSBHFUPINTERVALHXFT_PSY_A_CORE
Psychiatry f/u for med management of depression, anxiety, insomnia. Interim hx reviewed and d/w epilepsy team. Pt without any spells captured on EEG and is planned for dc. Med changes made including switch from Celexa to Zoloft and discontinuation of Ambien.    On interview, pt found awake, angry on approach, immediately shouting at writer for making medication changes and voicing concerns about her care. Pt reports that she expected further discussion prior to medication changes and is upset about many medication changes (including switch in SSRI, dc of Ambien, multiple vitamins recommended), lack of diagnosis for her spells (states "I guess I'm an enigma") and new elevation in LFTs. She states that she plans to return home and declines any further care affiliated with [facility]. Pt declined to further discuss any symptoms with writer due to lack of trust. Writer attempted education about medication recommendations and recommendation for OP f/u but pt declined.

## 2025-06-04 NOTE — DISCHARGE NOTE PROVIDER - CARE PROVIDER_API CALL
Carmen Mary  Neurology  [address] [address]  Phone: [phone]  Fax: [phone]  Established Patient  Scheduled Appointment: 07/02/2025

## 2025-06-04 NOTE — PROGRESS NOTE ADULT - SUBJECTIVE AND OBJECTIVE BOX
Feeling okay today, [place].  Denies any abdominal pain.  Met with psychiatry yesterday.     OVERNIGHT EVENTS: NAEO    Remaining ROS negative     PHYSICAL EXAM:    Physical exam  General: sitting up in bed  heent: ncat, mmm, eeg leads applied  cards: rrr, normal s1s2  pulm: ctab, no wheeze  ext: no edema, wwp  neuro: speech fluent, no facial asymmetry    VITAL SIGNS:  Vital Signs Last 24 Hrs  T(C): 37.4 (04 Jun 2025 11:23), Max: 37.4 (04 Jun 2025 11:23)  T(F): 99.4 (04 Jun 2025 11:23), Max: 99.4 (04 Jun 2025 11:23)  HR: 61 (04 Jun 2025 11:23) (55 - 67)  BP: 122/67 (04 Jun 2025 11:23) (105/64 - 136/64)  BP(mean): 102 (04 Jun 2025 05:54) (102 - 102)  RR: 18 (04 Jun 2025 11:23) (18 - 18)  SpO2: 97% (04 Jun 2025 11:23) (95% - 97%)    Parameters below as of 04 Jun 2025 11:23  Patient On (Oxygen Delivery Method): room air          MEDICATIONS:  MEDICATIONS  (STANDING):  apixaban 5 milliGRAM(s) Oral <User Schedule>  ascorbic acid 500 milliGRAM(s) Oral daily  calcitriol   Capsule 0.25 MICROGram(s) Oral <User Schedule>  gabapentin 400 milliGRAM(s) Oral <User Schedule>  gabapentin 800 milliGRAM(s) Oral <User Schedule>  levothyroxine 100 MICROGram(s) Oral daily  lidocaine   4% Patch 1 Patch Transdermal every 24 hours  lidocaine   4% Patch 1 Patch Transdermal every 24 hours  lidocaine   4% Patch 1 Patch Transdermal every 24 hours  losartan 50 milliGRAM(s) Oral <User Schedule>  metoprolol succinate ER 50 milliGRAM(s) Oral <User Schedule>  pantoprazole    Tablet 40 milliGRAM(s) Oral before breakfast  sertraline 50 milliGRAM(s) Oral <User Schedule>  traZODone 150 milliGRAM(s) Oral at bedtime    MEDICATIONS  (PRN):  acetaminophen     Tablet .. 650 milliGRAM(s) Oral every 6 hours PRN Temp greater or equal to 38.5C (101.3F), Mild Pain (1 - 3)  cyclobenzaprine 10 milliGRAM(s) Oral at bedtime PRN Muscle Spasm  cyclobenzaprine 5 milliGRAM(s) Oral <User Schedule> PRN Muscle Spasm  LORazepam   Injectable 2 milliGRAM(s) IV Push every 2 hours PRN Seizure Activity  oxyCODONE    IR 10 milliGRAM(s) Oral every 6 hours PRN Severe Pain (7 - 10)  traMADol 50 milliGRAM(s) Oral two times a day PRN Moderate Pain (4 - 6)      ALLERGIES:  Allergies    Percodan (Stomach Upset)  aspirin (Stomach Upset)  Originally Entered as [Anaphylaxis] reaction to [Bee Stings/Wasp Stings] (Anaphylaxis)  Ambien (Unknown)    Intolerances        LABS:                        12.0   5.06  )-----------( 187      ( 04 Jun 2025 07:15 )             36.5     06-04    143  |  103  |  11  ----------------------------<  98  4.5   |  31  |  0.95    Ca    8.9      04 Jun 2025 07:15  Phos  3.8     06-04  Mg     1.7     06-04    TPro  6.4  /  Alb  4.0  /  TBili  0.4  /  DBili  x   /  AST  104[H]  /  ALT  58[H]  /  AlkPhos  97  06-04      Urinalysis Basic - ( 04 Jun 2025 07:15 )    Color: x / Appearance: x / SG: x / pH: x  Gluc: 98 mg/dL / Ketone: x  / Bili: x / Urobili: x   Blood: x / Protein: x / Nitrite: x   Leuk Esterase: x / RBC: x / WBC x   Sq Epi: x / Non Sq Epi: x / Bacteria: x      CAPILLARY BLOOD GLUCOSE          RADIOLOGY & ADDITIONAL TESTS: Reviewed.

## 2025-06-04 NOTE — DISCHARGE NOTE PROVIDER - NSDCFUSCHEDAPPT_GEN_ALL_CORE_FT
Carmen Mary  [facility] Physician [facility]  NEUROLOGY [address]  Scheduled Appointment: 07/02/2025    Arcenio Gordon  [facility] Physician [facility]  OPHTHALM [address]  Scheduled Appointment: 08/22/2025     Arcenio Gordon  [facility] Physician [facility]  OPHTHALM [address]  Scheduled Appointment: 08/22/2025
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
## 2025-06-04 NOTE — PROGRESS NOTE ADULT - PROBLEM SELECTOR PLAN 8
Hx of vitamin D Deficiency    - Continue Calcitrol 0.25mg BID. Hx of Back, knee and shoulder pain    - Continue oxycodone 10mg Q6hrs PRN   - Continue tramadol 50mg QD PRN  - Continue flexeril 5mg in AM and 10mg in PM PRN  - Continue lidocaine patch (1 for both knees and 1 on back - 3 total).

## 2025-06-04 NOTE — DISCHARGE NOTE PROVIDER - NSDCMRMEDTOKEN_GEN_ALL_CORE_FT
Ambien 10 mg oral tablet: 1 tab(s) orally once a day (at bedtime)  calcitriol 0.25 mcg oral capsule: 1 cap(s) orally every 12 hours  citalopram 40 mg oral tablet: 1 tab(s) orally once a day  Eliquis 5 mg oral tablet: 1 tab(s) orally every 12 hours  Flexeril 10 mg oral tablet: 1 tab(s) orally once a day (at bedtime)  Flexeril 5 mg oral tablet: 1 tab(s) orally once a day (in the morning)  gabapentin 400 mg oral capsule: 2 cap(s) orally once a day (at bedtime)  gabapentin 400 mg oral capsule: 1 cap(s) orally once a day (in the morning)  lidocaine 4% topical film: Apply topically to affected area every 12 hours  losartan 50 mg oral tablet: 1 tab(s) orally once a day  metoprolol succinate 50 mg oral capsule, extended release: 1 cap(s) orally once a day  oxyCODONE 10 mg oral tablet: 1 tab(s) orally every 6 hours As needed Severe Pain (7 - 10)  Protonix 40 mg oral delayed release tablet: 1 tab(s) orally once a day  Synthroid 100 mcg (0.1 mg) oral tablet: 1 tab(s) orally once a day  traMADol 50 mg oral tablet: 1 tab(s) orally 2 times a day As needed Moderate Pain (4 - 6)  traZODone 50 mg oral tablet: 3 tab(s) orally once a day (at bedtime)  Wellbutrin  mg/24 hours oral tablet, extended release: 1 tab(s) orally once a day   Ambien 10 mg oral tablet: 1 tab(s) orally once a day (at bedtime)  calcitriol 0.25 mcg oral capsule: 1 cap(s) orally every 12 hours  citalopram 40 mg oral tablet: 1 tab(s) orally once a day  Eliquis 5 mg oral tablet: 1 tab(s) orally every 12 hours  Flexeril 10 mg oral tablet: 1 tab(s) orally once a day (at bedtime)  Flexeril 5 mg oral tablet: 1 tab(s) orally once a day (in the morning)  gabapentin 400 mg oral capsule: 2 cap(s) orally once a day (at bedtime)  gabapentin 400 mg oral capsule: 1 cap(s) orally once a day (in the morning)  lidocaine 4% topical film: Apply topically to affected area every 12 hours  losartan 50 mg oral tablet: 1 tab(s) orally once a day  metoprolol succinate 50 mg oral capsule, extended release: 1 cap(s) orally once a day  Outpatient OT - R26.81: Please evaluate and treat three times per week  Outpatient PT - R26.81: Please evaluate and treat 3 times per week  oxyCODONE 10 mg oral tablet: 1 tab(s) orally every 6 hours As needed Severe Pain (7 - 10)  Protonix 40 mg oral delayed release tablet: 1 tab(s) orally once a day  Synthroid 100 mcg (0.1 mg) oral tablet: 1 tab(s) orally once a day  traMADol 50 mg oral tablet: 1 tab(s) orally 2 times a day As needed Moderate Pain (4 - 6)  traZODone 50 mg oral tablet: 3 tab(s) orally once a day (at bedtime)  Wellbutrin  mg/24 hours oral tablet, extended release: 1 tab(s) orally once a day   Ambien 10 mg oral tablet: 1 tab(s) orally once a day (at bedtime)  calcitriol 0.25 mcg oral capsule: 1 cap(s) orally every 12 hours  citalopram 40 mg oral tablet: 1 tab(s) orally once a day  Eliquis 5 mg oral tablet: 1 tab(s) orally every 12 hours  Flexeril 10 mg oral tablet: 1 tab(s) orally once a day (at bedtime)  Flexeril 5 mg oral tablet: 1 tab(s) orally once a day (in the morning)  gabapentin 400 mg oral capsule: 2 cap(s) orally once a day (at bedtime)  gabapentin 400 mg oral capsule: 1 cap(s) orally once a day (in the morning)  lidocaine 4% topical film: Apply topically to affected area every 12 hours  losartan 50 mg oral tablet: 1 tab(s) orally once a day  metoprolol succinate 50 mg oral capsule, extended release: 1 cap(s) orally once a day  Outpatient OT - R26.81: Please evaluate and treat patient three times per week  Outpatient PT - R26.81: Please evaluate and treat three times per week  oxyCODONE 10 mg oral tablet: 1 tab(s) orally every 6 hours As needed Severe Pain (7 - 10)  Protonix 40 mg oral delayed release tablet: 1 tab(s) orally once a day  sertraline 50 mg oral tablet: 1 tab(s) orally once a day MDD: 50mg  Synthroid 100 mcg (0.1 mg) oral tablet: 1 tab(s) orally once a day  traMADol 50 mg oral tablet: 1 tab(s) orally 2 times a day As needed Moderate Pain (4 - 6)  traZODone 50 mg oral tablet: 3 tab(s) orally once a day (at bedtime)  Wellbutrin  mg/24 hours oral tablet, extended release: 1 tab(s) orally once a day  zolpidem 5 mg oral tablet: 1 tab(s) orally once a day (at bedtime) as needed for Insomnia MDD: 5mg   calcitriol 0.25 mcg oral capsule: 1 cap(s) orally every 12 hours  Eliquis 5 mg oral tablet: 1 tab(s) orally every 12 hours  Flexeril 10 mg oral tablet: 1 tab(s) orally once a day (at bedtime)  Flexeril 5 mg oral tablet: 1 tab(s) orally once a day (in the morning)  gabapentin 400 mg oral capsule: 2 cap(s) orally once a day (at bedtime)  gabapentin 400 mg oral capsule: 1 cap(s) orally once a day (in the morning)  lidocaine 4% topical film: Apply topically to affected area every 12 hours  losartan 50 mg oral tablet: 1 tab(s) orally once a day  metoprolol succinate 50 mg oral capsule, extended release: 1 cap(s) orally once a day  oxyCODONE 10 mg oral tablet: 1 tab(s) orally every 6 hours As needed Severe Pain (7 - 10)  Protonix 40 mg oral delayed release tablet: 1 tab(s) orally once a day  sertraline 50 mg oral tablet: 1 tab(s) orally once a day MDD: 50mg  Synthroid 100 mcg (0.1 mg) oral tablet: 1 tab(s) orally once a day  traMADol 50 mg oral tablet: 1 tab(s) orally 2 times a day As needed Moderate Pain (4 - 6)  traZODone 50 mg oral tablet: 3 tab(s) orally once a day (at bedtime)  zolpidem 5 mg oral tablet: 1 tab(s) orally once a day (at bedtime) as needed for Insomnia MDD: 5mg

## 2025-06-04 NOTE — PROGRESS NOTE ADULT - SUBJECTIVE AND OBJECTIVE BOX
EPILEPSY PROGRESS NOTE:  No events overnight. No complaints currently.    REVIEW OF SYSTEMS:  As above, otherwise negative for constitutional/HEENT/CV/pulm/GI//MSK/neuro/derm/endocrine/psych.    MEDICATIONS:   MEDICATIONS  (STANDING):  apixaban 5 milliGRAM(s) Oral <User Schedule>  ascorbic acid 500 milliGRAM(s) Oral daily  calcitriol   Capsule 0.25 MICROGram(s) Oral <User Schedule>  gabapentin 400 milliGRAM(s) Oral <User Schedule>  gabapentin 800 milliGRAM(s) Oral <User Schedule>  levothyroxine 100 MICROGram(s) Oral daily  lidocaine   4% Patch 1 Patch Transdermal every 24 hours  lidocaine   4% Patch 1 Patch Transdermal every 24 hours  lidocaine   4% Patch 1 Patch Transdermal every 24 hours  losartan 50 milliGRAM(s) Oral <User Schedule>  magnesium sulfate  IVPB 2 Gram(s) IV Intermittent once  metoprolol succinate ER 50 milliGRAM(s) Oral <User Schedule>  pantoprazole    Tablet 40 milliGRAM(s) Oral before breakfast  sertraline 50 milliGRAM(s) Oral <User Schedule>  traZODone 150 milliGRAM(s) Oral at bedtime    MEDICATIONS  (PRN):  acetaminophen     Tablet .. 650 milliGRAM(s) Oral every 6 hours PRN Temp greater or equal to 38.5C (101.3F), Mild Pain (1 - 3)  cyclobenzaprine 10 milliGRAM(s) Oral at bedtime PRN Muscle Spasm  cyclobenzaprine 5 milliGRAM(s) Oral <User Schedule> PRN Muscle Spasm  LORazepam   Injectable 2 milliGRAM(s) IV Push every 2 hours PRN Seizure Activity  oxyCODONE    IR 10 milliGRAM(s) Oral every 6 hours PRN Severe Pain (7 - 10)  traMADol 50 milliGRAM(s) Oral two times a day PRN Moderate Pain (4 - 6)      VITAL SIGNS:  T(C): 37.2 (06-04-25 @ 05:54), Max: 37.2 (06-03-25 @ 12:25)  HR: 59 (06-04-25 @ 05:54) (55 - 61)  BP: 134/86 (06-04-25 @ 05:54) (105/64 - 134/86)  RR: 18 (06-04-25 @ 05:54) (18 - 19)  SpO2: 96% (06-04-25 @ 05:54) (95% - 96%)  Wt(kg): --    PHYSICAL EXAM:  Eyes open spontaneously. Conversational with appropriate sentences.  EOMI. Visual fields full. PERRL 3>2. Face symmetrical.  Full strength throughout.  Finger-nose-finger intact R/L.  Intact to light touch throughout.    LABS:  CBC Full  -  ( 04 Jun 2025 07:15 )  WBC Count : 5.06 K/uL  RBC Count : 3.56 M/uL  Hemoglobin : 12.0 g/dL  Hematocrit : 36.5 %  Platelet Count - Automated : 187 K/uL  Mean Cell Volume : 102.5 fl  Mean Cell Hemoglobin : 33.7 pg  Mean Cell Hemoglobin Concentration : 32.9 g/dL  Auto Neutrophil # : x  Auto Lymphocyte # : x  Auto Monocyte # : x  Auto Eosinophil # : x  Auto Basophil # : x  Auto Neutrophil % : x  Auto Lymphocyte % : x  Auto Monocyte % : x  Auto Eosinophil % : x  Auto Basophil % : x    06-04    143  |  103  |  11  ----------------------------<  98  4.5   |  31  |  0.95    Ca    8.9      04 Jun 2025 07:15  Phos  3.8     06-04  Mg     1.7     06-04    TPro  6.4  /  Alb  4.0  /  TBili  0.4  /  DBili  x   /  AST  104[H]  /  ALT  58[H]  /  AlkPhos  97  06-04    LIVER FUNCTIONS - ( 04 Jun 2025 07:15 )  Alb: 4.0 g/dL / Pro: 6.4 g/dL / ALK PHOS: 97 U/L / ALT: 58 U/L / AST: 104 U/L / GGT: x           PT/INR - ( 02 Jun 2025 12:00 )   PT: 15.5 sec;   INR: 1.35          PTT - ( 02 Jun 2025 12:00 )  PTT:44.0 sec      EEG: EPILEPSY PROGRESS NOTE:  Patient seen and examined at beside, and report that she did not have an event despite ambulation in the room. Will attempt later today outside of the room.     REVIEW OF SYSTEMS:  CARDIOVASCULAR:  No chest pain, chest pressure or chest discomfort. No palpitations or edema.  RESPIRATORY:  No shortness of breath, cough or sputum.  GASTROINTESTINAL:  No anorexia, nausea, vomiting or diarrhea. No abdominal pain or blood.  GENITOURINARY: No Burning on urination.   NEUROLOGICAL:  see HPI    MEDICATIONS  (STANDING):  apixaban 5 milliGRAM(s) Oral <User Schedule>  ascorbic acid 500 milliGRAM(s) Oral daily  calcitriol   Capsule 0.25 MICROGram(s) Oral <User Schedule>  gabapentin 400 milliGRAM(s) Oral <User Schedule>  gabapentin 800 milliGRAM(s) Oral <User Schedule>  levothyroxine 100 MICROGram(s) Oral daily  lidocaine   4% Patch 1 Patch Transdermal every 24 hours  lidocaine   4% Patch 1 Patch Transdermal every 24 hours  lidocaine   4% Patch 1 Patch Transdermal every 24 hours  losartan 50 milliGRAM(s) Oral <User Schedule>  magnesium sulfate  IVPB 2 Gram(s) IV Intermittent once  metoprolol succinate ER 50 milliGRAM(s) Oral <User Schedule>  pantoprazole    Tablet 40 milliGRAM(s) Oral before breakfast  sertraline 50 milliGRAM(s) Oral <User Schedule>  traZODone 150 milliGRAM(s) Oral at bedtime    MEDICATIONS  (PRN):  acetaminophen     Tablet .. 650 milliGRAM(s) Oral every 6 hours PRN Temp greater or equal to 38.5C (101.3F), Mild Pain (1 - 3)  cyclobenzaprine 10 milliGRAM(s) Oral at bedtime PRN Muscle Spasm  cyclobenzaprine 5 milliGRAM(s) Oral <User Schedule> PRN Muscle Spasm  LORazepam   Injectable 2 milliGRAM(s) IV Push every 2 hours PRN Seizure Activity  oxyCODONE    IR 10 milliGRAM(s) Oral every 6 hours PRN Severe Pain (7 - 10)  traMADol 50 milliGRAM(s) Oral two times a day PRN Moderate Pain (4 - 6)    VITAL SIGNS:  T(C): 37.2 (06-04-25 @ 05:54), Max: 37.2 (06-03-25 @ 12:25)  HR: 59 (06-04-25 @ 05:54) (55 - 61)  BP: 134/86 (06-04-25 @ 05:54) (105/64 - 134/86)  RR: 18 (06-04-25 @ 05:54) (18 - 19)  SpO2: 96% (06-04-25 @ 05:54) (95% - 96%)  Wt(kg): --    PHYSICAL EXAM:  Physical Exam: GENERAL APPEARANCE: Elderly looking woman who appears to be having some pain while ambulating.  EYES: PERRLA 2mm brisk, EOMI. vision is grossly intact.  EARS: External auditory canals and tympanic membranes clear, hearing grossly intact.  NOSE: No nasal discharge.  CARDIAC: Normal S1 and S2. No S3, S4 or murmurs. Rhythm is regular. There is no peripheral edema, cyanosis or pallor. Extremities are warm and well perfused. Capillary refill is less than 2 seconds. No carotid bruits.  LUNGS: Clear to auscultation   ABDOMEN: Positive bowel sounds. Soft, nondistended, nontender. No guarding or rebound. No masses.  MUSCULOSKELETAL: Very unsteady gait and she ambulates with a cane  EXTREMITIES: No significant deformity or joint abnormality. No edema. Peripheral pulses intact. No varicosities.  SKIN: Skin normal color, texture and turgor with no lesions or eruptions.    Neurological Exam:  Mental Status: Alert and orientated to self, date and place.  Attention intact.  No dysarthria, aphasia or neglect.  Knowledge intact.  Registration intact.  Short and long term memory grossly intact.      Cranial Nerves: CN I - not tested.  PERRLA 2mm brisk, EOMI, VFF, no nystagmus or diplopia.  CN V1-3 intact to light touch.  No facial asymmetry.  Hearing intact to finger rub bilaterally.  Tongue, uvula and palate midline.  Sternocleidomastoid and Trapezius intact bilaterally.    Motor:   Tone: normal.                  Strength:     [] Upper extremity                      Delt       Bicep    Tricep                                                  R         4/5        4/5        4/5       5/5                                               L          4/5        4/5        4/5       4/5  [] Lower extremity                       HF          KE          KF        DF         PF                                               R        4/5        4/5        4/5       4/5       4/5                                               L         3/5        3/5       3/5       3/5        3/5  Pronator drift: none                 Dysmetria: None to finger-nose-finger   Tremor: No resting, postural or action tremor.  No myoclonus.  Sensation: Diminished on the 3 digits Middle finger to pinky on both hands d/t nerve entrapped at wrist. B/L LE numbness from knee down and worse on L.   Deep Tendon Reflexes: 1+ bilateral biceps, triceps, brachioradialis, knee and ankle  Toes mute  Gait: Unsteady gait with cane. Required contact guard while ambulating.    LABS:  CBC Full  -  ( 04 Jun 2025 07:15 )  WBC Count : 5.06 K/uL  RBC Count : 3.56 M/uL  Hemoglobin : 12.0 g/dL  Hematocrit : 36.5 %  Platelet Count - Automated : 187 K/uL  Mean Cell Volume : 102.5 fl  Mean Cell Hemoglobin : 33.7 pg  Mean Cell Hemoglobin Concentration : 32.9 g/dL  Auto Neutrophil # : x  Auto Lymphocyte # : x  Auto Monocyte # : x  Auto Eosinophil # : x  Auto Basophil # : x  Auto Neutrophil % : x  Auto Lymphocyte % : x  Auto Monocyte % : x  Auto Eosinophil % : x  Auto Basophil % : x    06-04    143  |  103  |  11  ----------------------------<  98  4.5   |  31  |  0.95    Ca    8.9      04 Jun 2025 07:15  Phos  3.8     06-04  Mg     1.7     06-04    TPro  6.4  /  Alb  4.0  /  TBili  0.4  /  DBili  x   /  AST  104[H]  /  ALT  58[H]  /  AlkPhos  97  06-04    LIVER FUNCTIONS - ( 04 Jun 2025 07:15 )  Alb: 4.0 g/dL / Pro: 6.4 g/dL / ALK PHOS: 97 U/L / ALT: 58 U/L / AST: 104 U/L / GGT: x           PT/INR - ( 02 Jun 2025 12:00 )   PT: 15.5 sec;   INR: 1.35     PTT - ( 02 Jun 2025 12:00 )  PTT:44.0 sec         EPILEPSY PROGRESS NOTE:  Patient seen and examined at beside, and report that she did not have an event despite ambulation in the room, but upset that she is not permitted to walk more to elicit an event. Will attempt later today outside of the room.     REVIEW OF SYSTEMS:  CARDIOVASCULAR:  No chest pain, chest pressure or chest discomfort. No palpitations or edema.  RESPIRATORY:  No shortness of breath, cough or sputum.  GASTROINTESTINAL:  No anorexia, nausea, vomiting or diarrhea. No abdominal pain or blood.  GENITOURINARY: No Burning on urination.   NEUROLOGICAL:  see HPI    MEDICATIONS  (STANDING):  apixaban 5 milliGRAM(s) Oral <User Schedule>  ascorbic acid 500 milliGRAM(s) Oral daily  calcitriol   Capsule 0.25 MICROGram(s) Oral <User Schedule>  gabapentin 400 milliGRAM(s) Oral <User Schedule>  gabapentin 800 milliGRAM(s) Oral <User Schedule>  levothyroxine 100 MICROGram(s) Oral daily  lidocaine   4% Patch 1 Patch Transdermal every 24 hours  lidocaine   4% Patch 1 Patch Transdermal every 24 hours  lidocaine   4% Patch 1 Patch Transdermal every 24 hours  losartan 50 milliGRAM(s) Oral <User Schedule>  magnesium sulfate  IVPB 2 Gram(s) IV Intermittent once  metoprolol succinate ER 50 milliGRAM(s) Oral <User Schedule>  pantoprazole    Tablet 40 milliGRAM(s) Oral before breakfast  sertraline 50 milliGRAM(s) Oral <User Schedule>  traZODone 150 milliGRAM(s) Oral at bedtime    MEDICATIONS  (PRN):  acetaminophen     Tablet .. 650 milliGRAM(s) Oral every 6 hours PRN Temp greater or equal to 38.5C (101.3F), Mild Pain (1 - 3)  cyclobenzaprine 10 milliGRAM(s) Oral at bedtime PRN Muscle Spasm  cyclobenzaprine 5 milliGRAM(s) Oral <User Schedule> PRN Muscle Spasm  LORazepam   Injectable 2 milliGRAM(s) IV Push every 2 hours PRN Seizure Activity  oxyCODONE    IR 10 milliGRAM(s) Oral every 6 hours PRN Severe Pain (7 - 10)  traMADol 50 milliGRAM(s) Oral two times a day PRN Moderate Pain (4 - 6)    VITAL SIGNS:  T(C): 37.2 (06-04-25 @ 05:54), Max: 37.2 (06-03-25 @ 12:25)  HR: 59 (06-04-25 @ 05:54) (55 - 61)  BP: 134/86 (06-04-25 @ 05:54) (105/64 - 134/86)  RR: 18 (06-04-25 @ 05:54) (18 - 19)  SpO2: 96% (06-04-25 @ 05:54) (95% - 96%)  Wt(kg): --    PHYSICAL EXAM:  Physical Exam: GENERAL APPEARANCE: Elderly looking woman who appears to be having some pain while ambulating.  EYES: PERRLA 2mm brisk, EOMI. vision is grossly intact.  EARS: External auditory canals and tympanic membranes clear, hearing grossly intact.  NOSE: No nasal discharge.  CARDIAC: Normal S1 and S2. No S3, S4 or murmurs. Rhythm is regular. There is no peripheral edema, cyanosis or pallor. Extremities are warm and well perfused. Capillary refill is less than 2 seconds. No carotid bruits.  LUNGS: Clear to auscultation   ABDOMEN: Positive bowel sounds. Soft, nondistended, nontender. No guarding or rebound. No masses.  MUSCULOSKELETAL: Very unsteady gait and she ambulates with a cane  EXTREMITIES: No significant deformity or joint abnormality. No edema. Peripheral pulses intact. No varicosities.  SKIN: Skin normal color, texture and turgor with no lesions or eruptions.    Neurological Exam:  Mental Status: Alert and orientated to self, date and place.  Attention intact.  No dysarthria, aphasia or neglect.  Knowledge intact.  Registration intact.  Short and long term memory grossly intact.      Cranial Nerves: CN I - not tested.  PERRLA 2mm brisk, EOMI, VFF, no nystagmus or diplopia.  CN V1-3 intact to light touch.  No facial asymmetry.  Hearing intact to finger rub bilaterally.  Tongue, uvula and palate midline.  Sternocleidomastoid and Trapezius intact bilaterally.    Motor:   Tone: normal.                  Strength:     [] Upper extremity                      Delt       Bicep    Tricep                                                  R         4/5        4/5        4/5       5/5                                               L          4/5        4/5        4/5       4/5  [] Lower extremity                       HF          KE          KF        DF         PF                                               R        4/5        4/5        4/5       4/5       4/5                                               L         3/5        3/5       3/5       3/5        3/5  Pronator drift: none                 Dysmetria: None to finger-nose-finger   Tremor: No resting, postural or action tremor.  No myoclonus.  Sensation: Diminished on the 3 digits Middle finger to pinky on both hands d/t nerve entrapped at wrist. B/L LE numbness from knee down and worse on L.   Deep Tendon Reflexes: 1+ bilateral biceps, triceps, brachioradialis, knee and ankle  Toes mute  Gait: Unsteady gait with cane. Required contact guard while ambulating.    LABS:  CBC Full  -  ( 04 Jun 2025 07:15 )  WBC Count : 5.06 K/uL  RBC Count : 3.56 M/uL  Hemoglobin : 12.0 g/dL  Hematocrit : 36.5 %  Platelet Count - Automated : 187 K/uL  Mean Cell Volume : 102.5 fl  Mean Cell Hemoglobin : 33.7 pg  Mean Cell Hemoglobin Concentration : 32.9 g/dL  Auto Neutrophil # : x  Auto Lymphocyte # : x  Auto Monocyte # : x  Auto Eosinophil # : x  Auto Basophil # : x  Auto Neutrophil % : x  Auto Lymphocyte % : x  Auto Monocyte % : x  Auto Eosinophil % : x  Auto Basophil % : x    06-04    143  |  103  |  11  ----------------------------<  98  4.5   |  31  |  0.95    Ca    8.9      04 Jun 2025 07:15  Phos  3.8     06-04  Mg     1.7     06-04    TPro  6.4  /  Alb  4.0  /  TBili  0.4  /  DBili  x   /  AST  104[H]  /  ALT  58[H]  /  AlkPhos  97  06-04    LIVER FUNCTIONS - ( 04 Jun 2025 07:15 )  Alb: 4.0 g/dL / Pro: 6.4 g/dL / ALK PHOS: 97 U/L / ALT: 58 U/L / AST: 104 U/L / GGT: x           PT/INR - ( 02 Jun 2025 12:00 )   PT: 15.5 sec;   INR: 1.35     PTT - ( 02 Jun 2025 12:00 )  PTT:44.0 sec

## 2025-06-04 NOTE — PROGRESS NOTE ADULT - PROBLEM SELECTOR PLAN 10
Nutrition & Prophylaxis:    F: None  E:  K>4, Mg>2, Phos >3  N: Regular diet  DVT PPx: SCDs  GI PPx: Protonix 40 mg PO QD  Dispo: 7 Andres  Code: FULL CODE. Hx of Insomnia    - Continue Ambien 10mg QHS PRN

## 2025-06-04 NOTE — PHYSICAL THERAPY INITIAL EVALUATION ADULT - PERTINENT HX OF CURRENT PROBLEM, REHAB EVAL
64 year-old-right-handed-female w/ PMH of remote seizures (40yrs ago), vasovagal syncope, Thyroid CA s/p parathyroidectomy and thyroidectomy 2017 and subsequent hypothyroidism, OA, tremor, vitamin D deficiency, HTN, spinal stenosis, osteoporosis, anxious, Afib s/p cardiac ablation 2022, B/L knee surgery, L shoulder replacement, breast lumpectomy, B/L hip replacements, ACDF C4 - 5, C5 - 6, C6 - 7 and lumbar laminectomy L3-4 and L4 - 5 who was admitted electively on 6/2/25 to characterize events that started a year ago while undergoing vEEG monitoring.

## 2025-06-04 NOTE — PHYSICAL THERAPY INITIAL EVALUATION ADULT - ADDITIONAL COMMENTS
Pt reports living in apartment, alone, without BERNARDO. Reports being independent with daily activities. Reports using SC for ambulation.

## 2025-06-04 NOTE — PROGRESS NOTE ADULT - PROBLEM SELECTOR PLAN 9
Hx of Insomnia    - Continue Ambien 10mg QHS PRN Hx of vitamin D Deficiency    - Continue Calcitrol 0.25mg BID.

## 2025-06-04 NOTE — DISCHARGE NOTE PROVIDER - NSDCCPTREATMENT_GEN_ALL_CORE_FT
PRINCIPAL PROCEDURE  Procedure: EEG, with video recording, 36-60 hours  Findings and Treatment:      PRINCIPAL PROCEDURE  Procedure: EEG, with video recording, 36-60 hours  Findings and Treatment:   1)	Normal awake and sleep study  2)	There were no findings of active epilepsy, however this alone does not rule out the diagnosis.      SECONDARY PROCEDURE  Procedure: Limited ultrasound of single quadrant of abdomen  Findings and Treatment: TECHNIQUE: Sonography of the right upper quadrant.  FINDINGS:  Liver: Within normal limits.  Bile ducts: Mild intrahepatic duct dilatation. Common bile duct measures   11 mm.  Gallbladder: Within normal limits. No stones or gallbladder wall   thickening. No sonographic Wilson's sign.  Pancreas: Visualized portions are within normal limits.  Right kidney: 11.0 cm. No hydronephrosis. Simple cyst 3 cm.  Ascites: None.  IVC: Visualized portions are within normal limits.  IMPRESSION:  Diffuse mild intra and extrahepatic biliary dilatation, concern for   distal common bile duct obstruction. Recommend follow-up   contrast-enhanced MRI/MRCP.  No gallstones or evidence of cholecystitis.

## 2025-06-04 NOTE — DISCHARGE NOTE PROVIDER - NSDCCPCAREPLAN_GEN_ALL_CORE_FT
PRINCIPAL DISCHARGE DIAGNOSIS  Diagnosis: Transient loss of consciousness  Assessment and Plan of Treatment: Transient loss of consciousness (TLOC) is defined as an episode of fainting, “blacking out,” or “passing out” in which a person suddenly loses and then quickly regains consciousness.  This happens when the brain temporarily doesn't get enough blood. One of the most common reasons this happens is called "reflex" or "vasovagal" syncope.  This can happen for lots of different kinds of reasons. People can have reflex syncope if they: Have stress from fear or pain (for example, because they are injured or have blood taken for tests), stand for too long or are too tired or overheated, or other body functions and sometimes reflex syncope happens with no clear cause. People can also have syncope that is not reflex. This can happen due to the following problems:  The heart beats too quickly or too slowly, blood pressure drops when you stand or sit up, do not drink enough water, take certain medicines that cause your blood pressure to drop, drink too much alcohol, lose a lot of blood, or have a medical condition that affects your blood pressure.  In many cases, TLOC is not dangerous. but it can be dangerous if you fall and hurt yourself when you faint. It can also be dangerous if you faint while driving. To be safe, check with your doctor before you start driving again. During this admission, you were out on Electroencephalography (EEG) to evaluate for seizure like activity and underwent provoking factors such as sleep deprivation, photic stimulation and hyperventilation which revealed 1 event of auras with black spot in vision during photic stimulation that did not correlate with EEG changes, and EEG showed normal records otherwise.

## 2025-06-04 NOTE — DISCHARGE NOTE PROVIDER - NSDCFUADDAPPT_GEN_ALL_CORE_FT
Encourage to establish outpatient psychiatric care. Please provide referral information for:    •	[facility]  o	[facility] [facility] [address]  o	[phone]     •	[facility]  o	[email or web address]  o	[address]  [address]  [phone]   Please STOP taking HTCZ and follow up with PCP for elevated liver enzymes if you do not stay to get ultrasound today.     Please follow up with Pain Management Specialist    Encourage to establish outpatient psychiatric care. Please provide referral information for:    •	[facility]  o	[address] [place] [id number]  o	[phone]     •	[facility]  o	[email or web address]  o	[address] [id number] [phone]   Please STOP taking Hydrochlorothiazide 12.5mg QD and follow up with PCP.    PER GI Doctor:   -Recommend outpatient genetic testing, labs: Ca19-9 and CEA  -Recommend outpatient Hep B vaccination  -Will plan for outpatient EGD/EUS w/ one of our advanced endoscopists Dr. Almanza, Dr. Leung, or Dr. Johnson (for evaluation of the distal CBD, biliary tree and pancreatic head)    Please follow up with Pain Management Specialist    AS PER PSYCHIATRY:  Stop taking the following medications:  Wellbutrin XL 150mg QD  Buspar 5mg Q12hrs  Citalopram 40mg QD    Encourage to establish outpatient psychiatric care. Please provide referral information for:    •	[facility]  o	[address] [place] [address]  o	[phone]     •	[facility]  o	[email or web address]  o	[address]  [address]  [phone]

## 2025-06-04 NOTE — PROGRESS NOTE ADULT - PROBLEM SELECTOR PLAN 3
Hx of HTN    - Continue Metoprolol XL 50mg QD  - Continue Losartan 50mg QD  - Discontinued HCTZ as it was recently stopped as an outpatient.
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
## 2025-06-04 NOTE — OCCUPATIONAL THERAPY INITIAL EVALUATION ADULT - MODALITIES TREATMENT COMMENTS
Pt able to perform bed mobility with supervision. Pt performed UB/LB dressing with supervision while seated at EOB, demo impaired dynamic balance and decreased functional reach, however did not require physical assist. Pt performed sit<->stand with supervision, and able to ambulate ~150ft in hallway with CGAx1 using SC. Pt demo impaired balance with unsteady gait, and impaired ability to weight shift. No buckling or LOB observed. Pt returned to bed and left as found, +all lines, +call bell, +bed alarm, NAD, NIDIA Mcfadden made aware of outcome. Pt would benefit from continued OT services to facilitate increased independence with functional mobility/ADLs.

## 2025-06-04 NOTE — DISCHARGE NOTE PROVIDER - ATTENDING DISCHARGE PHYSICAL EXAMINATION:
Pt was upset about many concerns she raised during this admission, most specifically on this day with being changed to outpatient GI work-up of an abnormality found on abdominal ultrasound, in the context of recently losing her sister to pancreatic cancer.  Exam findings were based on observation of her spontaneous movements, ambulation, receptive and expressive language abilities.  She walked with a cane and good balance despite being in a agitated mood.  Cane moved easily between hands.    No expressive or receptive errors.  Patient very expressive.

## 2025-06-04 NOTE — PHYSICAL THERAPY INITIAL EVALUATION ADULT - SENSORY TESTS
(L) hand  5/5, (R) hand  5/5. CN Testing: B/L Frontalis intact; B/L buccinator intact; smile symmetrical; tongue protrusion at midline; B/L eyes open/close intact; Shoulder elevation: intact bilaterally; Vision H-Test: bilateral tracking and smooth pursuit intact; Convergence/Divergence: intact; Vision Quadrant Test: intact bilaterally. Rapid alternating movements: intact bilaterally

## 2025-06-04 NOTE — OCCUPATIONAL THERAPY INITIAL EVALUATION ADULT - GENERAL OBSERVATIONS, REHAB EVAL
OT IE completed. Orders received, chart reviewed, pt cleared for OT by NIDIA Mcfadden. Pt received semi supine in bed, NAD, +heplock, +EEG. Pt A&Ox4, agreeable to OT, and tolerated session well.

## 2025-06-04 NOTE — PROGRESS NOTE ADULT - PROBLEM SELECTOR PLAN 7
Hx of Back, knee and shoulder pain    - Continue oxycodone 10mg Q6hrs PRN   - Continue tramadol 50mg QD PRN  - Continue flexeril 5mg in AM and 10mg in PM PRN  - Continue lidocaine patch (1 for both knees and 1 on back - 3 total). Elevated liver enzymes noted today:  Aspartate Aminotransferase (AST/SGOT): 146 U/L (06.04.25 @ 16:28)   Alanine Aminotransferase (ALT/SGPT): 74 U/L (06.04.25 @ 16:28)    Tylenol level was normal  RUQ ABD US ordered but patient refused.

## 2025-06-04 NOTE — BH CONSULTATION LIAISON PROGRESS NOTE - OTHER
grossly normal, grossly normal orientation though unable to formally assess due to lack of cooperation impaired, frequently tossing objects around the bed and waving arms angrily at writer focus on unhappiness with care. no voiced delusions, no SI/HI voiced stable posture seated in bed no focal deficits, recalled writer from previous day no abnormal movements observed on limited exam

## 2025-06-05 LAB
ALBUMIN SERPL ELPH-MCNC: 4 G/DL — SIGNIFICANT CHANGE UP (ref 3.3–5)
ALP SERPL-CCNC: 152 U/L — HIGH (ref 40–120)
ALT FLD-CCNC: 77 U/L — HIGH (ref 10–45)
ANION GAP SERPL CALC-SCNC: 8 MMOL/L — SIGNIFICANT CHANGE UP (ref 5–17)
AST SERPL-CCNC: 104 U/L — HIGH (ref 10–40)
BILIRUB SERPL-MCNC: 0.6 MG/DL — SIGNIFICANT CHANGE UP (ref 0.2–1.2)
BUN SERPL-MCNC: 10 MG/DL — SIGNIFICANT CHANGE UP (ref 7–23)
CALCIUM SERPL-MCNC: 9.3 MG/DL — SIGNIFICANT CHANGE UP (ref 8.4–10.5)
CHLORIDE SERPL-SCNC: 102 MMOL/L — SIGNIFICANT CHANGE UP (ref 96–108)
CO2 SERPL-SCNC: 29 MMOL/L — SIGNIFICANT CHANGE UP (ref 22–31)
CREAT SERPL-MCNC: 0.92 MG/DL — SIGNIFICANT CHANGE UP (ref 0.5–1.3)
EGFR: 69 ML/MIN/1.73M2 — SIGNIFICANT CHANGE UP
EGFR: 69 ML/MIN/1.73M2 — SIGNIFICANT CHANGE UP
GLUCOSE SERPL-MCNC: 91 MG/DL — SIGNIFICANT CHANGE UP (ref 70–99)
HCT VFR BLD CALC: 33.8 % — LOW (ref 34.5–45)
HGB BLD-MCNC: 11.8 G/DL — SIGNIFICANT CHANGE UP (ref 11.5–15.5)
MAGNESIUM SERPL-MCNC: 1.8 MG/DL — SIGNIFICANT CHANGE UP (ref 1.6–2.6)
MCHC RBC-ENTMCNC: 34.2 PG — HIGH (ref 27–34)
MCHC RBC-ENTMCNC: 34.9 G/DL — SIGNIFICANT CHANGE UP (ref 32–36)
MCV RBC AUTO: 98 FL — SIGNIFICANT CHANGE UP (ref 80–100)
NRBC BLD AUTO-RTO: 0 /100 WBCS — SIGNIFICANT CHANGE UP (ref 0–0)
PHOSPHATE SERPL-MCNC: 3.8 MG/DL — SIGNIFICANT CHANGE UP (ref 2.5–4.5)
PLATELET # BLD AUTO: 200 K/UL — SIGNIFICANT CHANGE UP (ref 150–400)
POTASSIUM SERPL-MCNC: 4.1 MMOL/L — SIGNIFICANT CHANGE UP (ref 3.5–5.3)
POTASSIUM SERPL-SCNC: 4.1 MMOL/L — SIGNIFICANT CHANGE UP (ref 3.5–5.3)
PROT SERPL-MCNC: 6.7 G/DL — SIGNIFICANT CHANGE UP (ref 6–8.3)
RBC # BLD: 3.45 M/UL — LOW (ref 3.8–5.2)
RBC # FLD: 11.9 % — SIGNIFICANT CHANGE UP (ref 10.3–14.5)
SODIUM SERPL-SCNC: 139 MMOL/L — SIGNIFICANT CHANGE UP (ref 135–145)
WBC # BLD: 5.93 K/UL — SIGNIFICANT CHANGE UP (ref 3.8–10.5)
WBC # FLD AUTO: 5.93 K/UL — SIGNIFICANT CHANGE UP (ref 3.8–10.5)

## 2025-06-05 PROCEDURE — 76705 ECHO EXAM OF ABDOMEN: CPT | Mod: 26

## 2025-06-05 PROCEDURE — 95720 EEG PHY/QHP EA INCR W/VEEG: CPT

## 2025-06-05 PROCEDURE — 99233 SBSQ HOSP IP/OBS HIGH 50: CPT

## 2025-06-05 RX ORDER — CITALOPRAM 20 MG/1
40 TABLET ORAL DAILY
Refills: 0 | Status: DISCONTINUED | OUTPATIENT
Start: 2025-06-05 | End: 2025-06-06

## 2025-06-05 RX ORDER — BUPROPION HYDROBROMIDE 522 MG/1
1 TABLET, EXTENDED RELEASE ORAL
Refills: 0 | DISCHARGE

## 2025-06-05 RX ORDER — CITALOPRAM 20 MG/1
1 TABLET ORAL
Refills: 0 | DISCHARGE

## 2025-06-05 RX ADMIN — OXYCODONE HYDROCHLORIDE 10 MILLIGRAM(S): 30 TABLET ORAL at 02:47

## 2025-06-05 RX ADMIN — Medication 100 MICROGRAM(S): at 06:13

## 2025-06-05 RX ADMIN — OXYCODONE HYDROCHLORIDE 10 MILLIGRAM(S): 30 TABLET ORAL at 18:28

## 2025-06-05 RX ADMIN — LIDOCAINE HYDROCHLORIDE 1 PATCH: 20 JELLY TOPICAL at 11:28

## 2025-06-05 RX ADMIN — GABAPENTIN 800 MILLIGRAM(S): 400 CAPSULE ORAL at 22:08

## 2025-06-05 RX ADMIN — CITALOPRAM 40 MILLIGRAM(S): 20 TABLET ORAL at 11:10

## 2025-06-05 RX ADMIN — Medication 40 MILLIGRAM(S): at 06:13

## 2025-06-05 RX ADMIN — LIDOCAINE HYDROCHLORIDE 1 PATCH: 20 JELLY TOPICAL at 06:17

## 2025-06-05 RX ADMIN — GABAPENTIN 400 MILLIGRAM(S): 400 CAPSULE ORAL at 11:10

## 2025-06-05 RX ADMIN — METOPROLOL SUCCINATE 50 MILLIGRAM(S): 50 TABLET, EXTENDED RELEASE ORAL at 11:27

## 2025-06-05 RX ADMIN — CALCITRIOL 0.25 MICROGRAM(S): 0.5 CAPSULE, GELATIN COATED ORAL at 11:10

## 2025-06-05 RX ADMIN — CITALOPRAM 40 MILLIGRAM(S): 20 TABLET ORAL at 06:13

## 2025-06-05 RX ADMIN — OXYCODONE HYDROCHLORIDE 10 MILLIGRAM(S): 30 TABLET ORAL at 08:38

## 2025-06-05 RX ADMIN — APIXABAN 5 MILLIGRAM(S): 2.5 TABLET, FILM COATED ORAL at 22:08

## 2025-06-05 RX ADMIN — Medication 150 MILLIGRAM(S): at 22:08

## 2025-06-05 RX ADMIN — OXYCODONE HYDROCHLORIDE 10 MILLIGRAM(S): 30 TABLET ORAL at 01:46

## 2025-06-05 RX ADMIN — APIXABAN 5 MILLIGRAM(S): 2.5 TABLET, FILM COATED ORAL at 11:10

## 2025-06-05 RX ADMIN — LIDOCAINE HYDROCHLORIDE 1 PATCH: 20 JELLY TOPICAL at 11:27

## 2025-06-05 RX ADMIN — LIDOCAINE HYDROCHLORIDE 1 PATCH: 20 JELLY TOPICAL at 06:16

## 2025-06-05 RX ADMIN — OXYCODONE HYDROCHLORIDE 10 MILLIGRAM(S): 30 TABLET ORAL at 09:38

## 2025-06-05 RX ADMIN — LOSARTAN POTASSIUM 50 MILLIGRAM(S): 100 TABLET, FILM COATED ORAL at 12:09

## 2025-06-05 RX ADMIN — CALCITRIOL 0.25 MICROGRAM(S): 0.5 CAPSULE, GELATIN COATED ORAL at 22:08

## 2025-06-05 NOTE — PROGRESS NOTE ADULT - PROBLEM SELECTOR PLAN 11
Nutrition & Prophylaxis:    F: None  E:  K>4, Mg>2, Phos >3  N: Regular diet  DVT PPx: SCDs  GI PPx: Protonix 40 mg PO QD  Dispo: [facility]  Code: FULL CODE.
Nutrition & Prophylaxis:    F: None  E:  K>4, Mg>2, Phos >3  N: Regular diet  DVT PPx: SCDs  GI PPx: Protonix 40 mg PO QD  Dispo: [facility]  Code: FULL CODE.

## 2025-06-05 NOTE — PROGRESS NOTE ADULT - NS ATTEND AMEND GEN_ALL_CORE FT
Pt upset today with many things.  Pain an issue and pain medications dosing.  Felt that other meds changed without her official ok.  Was angry then broke-down and became tearful and apologetic as she is grieving many losses, closest sister, aunt and her dog.    Plan:  1) continue to ambulate to provoke an event.  2) planned d/c tmrw 1pm with access-a-ride
Pt walked a lot more this morning/PM and almost felt faint and heart palpitations noted.  However review of the EEG showed only mild change in HR and no change in the EEG to suggest either hypoperfusion or an ictal process.    At the same time, abdo U/S revealed findings that will likely need further evaluation or biopsy, and GI service involved.  Cancelled discharge in case they are able to complete work-up during this admission.

## 2025-06-05 NOTE — PROGRESS NOTE ADULT - SUBJECTIVE AND OBJECTIVE BOX
EPILEPSY PROGRESS NOTE:  Patient seen at bedside, Denies any seizures/events/auras overnight. Patient c/o of pain currently. RN Ana will give oxycodone 10mg. Patient agreeable to do ultrasound after refusing yesterday, pending access-a-ride at 1PM.      Addendum:  Patient made aware of ultrasound results, tearful and wants to stay for further testing. GI team will see her today. Discharge cancelled, pending MRCP.       REVIEW OF SYSTEMS:  CONSTITUTIONAL:  No weight loss, fever, chills, weakness or fatigue.   CARDIOVASCULAR:  No chest pain, chest pressure or chest discomfort. No palpitations or edema.  RESPIRATORY:  No shortness of breath, cough or sputum.  NEUROLOGICAL:  see HPI  MUSCULOSKELETAL:  No muscle, back pain, joint pain or stiffness.    MEDICATIONS:   MEDICATIONS  (STANDING):  apixaban 5 milliGRAM(s) Oral <User Schedule>  calcitriol   Capsule 0.25 MICROGram(s) Oral <User Schedule>  citalopram 40 milliGRAM(s) Oral daily  gabapentin 400 milliGRAM(s) Oral <User Schedule>  gabapentin 800 milliGRAM(s) Oral <User Schedule>  levothyroxine 100 MICROGram(s) Oral daily  lidocaine   4% Patch 1 Patch Transdermal every 24 hours  lidocaine   4% Patch 1 Patch Transdermal every 24 hours  lidocaine   4% Patch 1 Patch Transdermal every 24 hours  losartan 50 milliGRAM(s) Oral <User Schedule>  metoprolol succinate ER 50 milliGRAM(s) Oral <User Schedule>  pantoprazole    Tablet 40 milliGRAM(s) Oral before breakfast  traZODone 150 milliGRAM(s) Oral at bedtime    MEDICATIONS  (PRN):  acetaminophen     Tablet .. 650 milliGRAM(s) Oral every 6 hours PRN Temp greater or equal to 38.5C (101.3F), Mild Pain (1 - 3)  cyclobenzaprine 10 milliGRAM(s) Oral at bedtime PRN Muscle Spasm  cyclobenzaprine 5 milliGRAM(s) Oral <User Schedule> PRN Muscle Spasm  diphenhydrAMINE 25 milliGRAM(s) Oral every 6 hours PRN Rash and/or Itching  LORazepam   Injectable 2 milliGRAM(s) IV Push every 2 hours PRN Seizure Activity  oxyCODONE    IR 10 milliGRAM(s) Oral every 6 hours PRN Severe Pain (7 - 10)  traMADol 50 milliGRAM(s) Oral two times a day PRN Moderate Pain (4 - 6)  zolpidem 5 milliGRAM(s) Oral at bedtime PRN Insomnia      VITAL SIGNS:  Vital Signs Last 24 Hrs  T(C): 37 (05 Jun 2025 10:30), Max: 37 (05 Jun 2025 10:30)  T(F): 98.6 (05 Jun 2025 10:30), Max: 98.6 (05 Jun 2025 10:30)  HR: 66 (05 Jun 2025 10:30) (56 - 66)  BP: 101/69 (05 Jun 2025 10:30) (101/69 - 118/69)  BP(mean): --  RR: 18 (05 Jun 2025 10:30) (18 - 18)  SpO2: 96% (05 Jun 2025 10:30) (96% - 98%)    Parameters below as of 05 Jun 2025 05:30  Patient On (Oxygen Delivery Method): room air        PHYSICAL EXAM:  Constitutional: Appearance is consistent with chronologic age. No acute distress    Neurologic:  -Mental status: Awake, alert, oriented to person, place, and date. Speech is fluent with intact naming, repetition, and comprehension, no dysarthria. Recent and remote memory intact. Follows commands. Attention/concentration intact.   -Cranial nerves:   II: Visual fields are full to confrontation.  III, IV, VI: Extraocular movements are intact without nystagmus. Pupils equally round and reactive to light. 3mm Brisk.   V:  Facial sensation V1-V3 equal and intact   VII: Face is symmetric with normal eye closure and smile  VIII: Hearing is bilaterally intact to finger rub  IX, X: Uvula is midline and soft palate rises symmetrically  XI: Head turning and shoulder shrug are intact.  XII: Tongue protrudes midline  Motor: Normal bulk and tone. No pronator drift.   Strength:     [] Upper extremity                      Delt       Bicep    Tricep                                                  R         4/5        4/5        4/5       4/5                                               L          4/5        4/5        4/5       4/5  [] Lower extremity                       HF          KE          KF        DF         PF                                               R        4/5        4/5        4/5       4/5       4/5                                               L         4-/5 /5      4-/5 /5        4-/5        4-/5    Sensation: B/L LE numbness from knee down and worse on L.   Coordination: No dysmetria on finger-to-nose bilaterally  Reflexes: 2+ b/l biceps, triceps, patella and achilles. Plantar response muted  Gait: Unsteady gait, ambulates with cane. Required contact guard/chair follow while ambulating.    LABS:  CBC Full  -  ( 05 Jun 2025 06:19 )  WBC Count : 5.93 K/uL  RBC Count : 3.45 M/uL  Hemoglobin : 11.8 g/dL  Hematocrit : 33.8 %  Platelet Count - Automated : 200 K/uL  Mean Cell Volume : 98.0 fl  Mean Cell Hemoglobin : 34.2 pg  Mean Cell Hemoglobin Concentration : 34.9 g/dL  Auto Neutrophil # : x  Auto Lymphocyte # : x  Auto Monocyte # : x  Auto Eosinophil # : x  Auto Basophil # : x  Auto Neutrophil % : x  Auto Lymphocyte % : x  Auto Monocyte % : x  Auto Eosinophil % : x  Auto Basophil % : x    06-05    139  |  102  |  10  ----------------------------<  91  4.1   |  29  |  0.92    Ca    9.3      05 Jun 2025 06:19  Phos  3.8     06-05  Mg     1.8     06-05    TPro  6.7  /  Alb  4.0  /  TBili  0.6  /  DBili  x   /  AST  104[H]  /  ALT  77[H]  /  AlkPhos  152[H]  06-05    LIVER FUNCTIONS - ( 05 Jun 2025 06:19 )  Alb: 4.0 g/dL / Pro: 6.7 g/dL / ALK PHOS: 152 U/L / ALT: 77 U/L / AST: 104 U/L / GGT: x                 Radiology and Other Testings:    < from: US Abdomen Upper Quadrant Right (06.05.25 @ 13:42) >  FINDINGS:  Liver: Within normal limits.  Bile ducts: Mild intrahepatic duct dilatation. Common bile duct measures   11 mm.  Gallbladder: Within normal limits. No stones or gallbladder wall   thickening. No sonographic Wilson's sign.  Pancreas: Visualized portions are within normal limits.  Right kidney: 11.0 cm. No hydronephrosis. Simple cyst 3 cm.  Ascites: None.  IVC: Visualized portions are within normal limits.    IMPRESSION:  Diffuse mild intra and extrahepatic biliary dilatation, concern for   distal common bile duct obstruction. Recommend follow-up   contrast-enhanced MRI/MRCP.  No gallstones or evidence of cholecystitis.    < end of copied text >      EEG:  Day 3:  6/4/2025, 7:00:25 AM to next morning at 07:00 AM   Background:  continuous, with predominantly alpha and beta frequencies.  Generalized Slowing:  None  Symmetry/Focality: No persistent asymmetries of voltage or frequency.       Voltage:  Normal (20+ uV)  Organization:  Appropriate anterior-posterior gradient  Posterior Dominant Rhythm:  10 Hz symmetric, well-organized, and well-modulated  Sleep:  Symmetric, synchronous spindles and K complexes.  Variability:   Yes		Reactivity:  Yes    Spontaneous Activity:  No epileptiform discharges     Events:  1)	There was a push button for agitation/being emotional without ictal change on EEG.  Provocations:  •	Hyperventilation: was not performed.  •	Photic stimulation: was not performed.  Daily Summary:    1)	Normal awake and sleep study  2)	There were no findings of active epilepsy, however this alone does not rule out the diagnosis.

## 2025-06-05 NOTE — PROGRESS NOTE ADULT - PROBLEM SELECTOR PLAN 7
Hx of hypothyroidism after thyroid CA s/p thyroidectomy and parathyroidectomy in 2017    - Continue Synthroid 100mcg QD.

## 2025-06-05 NOTE — PROGRESS NOTE ADULT - PROBLEM SELECTOR PLAN 5
Hx of paresthesia     - Continue Gabapentin 400mg in AM and 800mg in PM
Hx of paresthesia     - Continue Gabapentin 400mg in AM and 800mg in PM.
Hx of depression, but does not currently have a psychiatrist    - Consulted psychiatry, appreciate recs. Recommendations provided to continue trazadone 50 but patient takes 150mg QHS as well as to hold wellbutrin.   - Hold Wellbutrin XL 150mg QHS started by her cardiologist a few months ago as per psychiatry recommendations  - D/C Buspar as per psychiatry recommendations  - Continue trazadone 150mg QHS  - D/C Citalopram and start sertraline 50mg QD as per psychiatrist   - D/C Ambien 10mg as per psychiatrist.

## 2025-06-05 NOTE — EEG REPORT - NS EEG TEXT BOX
[facility] Department of Neurology  Inpatient Epilepsy Monitoring Unit video-Electroencephalography Report    Acquisition Details:  Electroencephalography was acquired using a minimum of 21 channels on an [employer] Neurology system v 9.3.1 with electrode placement according to the standard International 10-20 system following ACNS (American Clinical Neurophysiology Society) guidelines for Long-Term Video EEG monitoring.  Anterior temporal T1 and T2 electrodes were utilized whenever possible.   The [employer] automated spike & seizure detections were all reviewed in detail, in addition to extensive portions of raw EEG.  Specially-trained nurses were available for seizure-related events.  Continuous live-time video monitoring of the patients for seizure-related and safety events was performed by specially-trained technicians.        Findings:  Day 3:  6/4/2025, 7:00:25 AM to next morning at 07:00 AM   Background:  continuous, with predominantly alpha and beta frequencies.  Generalized Slowing:  None  Symmetry/Focality: No persistent asymmetries of voltage or frequency.       Voltage:  Normal (20+ uV)  Organization:  Appropriate anterior-posterior gradient  Posterior Dominant Rhythm:  10 Hz symmetric, well-organized, and well-modulated  Sleep:  Symmetric, synchronous spindles and K complexes.  Variability:   Yes		Reactivity:  Yes    Spontaneous Activity:  No epileptiform discharges     Events:  1)	There was a push button for agitation/being emotional without ictal change on EEG.  Provocations:  •	Hyperventilation: was not performed.  •	Photic stimulation: was not performed.  Daily Summary:    1)	Normal awake and sleep study  2)	There were no findings of active epilepsy, however this alone does not rule out the diagnosis.       Martine Chavez MD  CNP Fellow

## 2025-06-05 NOTE — PROGRESS NOTE ADULT - SUBJECTIVE AND OBJECTIVE BOX
Feeling okay today.  Denies any new symptoms.  She was frustrated yesterday, but now agreeable to having an ultrasound of her abdomen before she's discharged.       OVERNIGHT EVENTS: NAEO      Remaining ROS negative       PHYSICAL EXAM:    Physical exam  General: sitting up in chair, no acute distress  heent: ncat, mmm, eeg leads applied  cards: rrr, normal s1s2  pulm: ctab, no wheeze  ext: no edema, wwp  neuro: speech fluent, no facial asymmetry    VITAL SIGNS:  Vital Signs Last 24 Hrs  T(C): 37 (05 Jun 2025 10:30), Max: 37 (05 Jun 2025 10:30)  T(F): 98.6 (05 Jun 2025 10:30), Max: 98.6 (05 Jun 2025 10:30)  HR: 66 (05 Jun 2025 10:30) (56 - 66)  BP: 101/69 (05 Jun 2025 10:30) (101/69 - 118/69)  BP(mean): --  RR: 18 (05 Jun 2025 10:30) (18 - 18)  SpO2: 96% (05 Jun 2025 10:30) (96% - 98%)    Parameters below as of 05 Jun 2025 05:30  Patient On (Oxygen Delivery Method): room air      MEDICATIONS:  MEDICATIONS  (STANDING):  apixaban 5 milliGRAM(s) Oral <User Schedule>  calcitriol   Capsule 0.25 MICROGram(s) Oral <User Schedule>  citalopram 40 milliGRAM(s) Oral daily  gabapentin 400 milliGRAM(s) Oral <User Schedule>  gabapentin 800 milliGRAM(s) Oral <User Schedule>  levothyroxine 100 MICROGram(s) Oral daily  lidocaine   4% Patch 1 Patch Transdermal every 24 hours  lidocaine   4% Patch 1 Patch Transdermal every 24 hours  lidocaine   4% Patch 1 Patch Transdermal every 24 hours  losartan 50 milliGRAM(s) Oral <User Schedule>  metoprolol succinate ER 50 milliGRAM(s) Oral <User Schedule>  pantoprazole    Tablet 40 milliGRAM(s) Oral before breakfast  traZODone 150 milliGRAM(s) Oral at bedtime    MEDICATIONS  (PRN):  acetaminophen     Tablet .. 650 milliGRAM(s) Oral every 6 hours PRN Temp greater or equal to 38.5C (101.3F), Mild Pain (1 - 3)  cyclobenzaprine 10 milliGRAM(s) Oral at bedtime PRN Muscle Spasm  cyclobenzaprine 5 milliGRAM(s) Oral <User Schedule> PRN Muscle Spasm  diphenhydrAMINE 25 milliGRAM(s) Oral every 6 hours PRN Rash and/or Itching  LORazepam   Injectable 2 milliGRAM(s) IV Push every 2 hours PRN Seizure Activity  oxyCODONE    IR 10 milliGRAM(s) Oral every 6 hours PRN Severe Pain (7 - 10)  traMADol 50 milliGRAM(s) Oral two times a day PRN Moderate Pain (4 - 6)  zolpidem 5 milliGRAM(s) Oral at bedtime PRN Insomnia      ALLERGIES:  Allergies    Percodan (Stomach Upset)  aspirin (Stomach Upset)  Originally Entered as [Anaphylaxis] reaction to [Bee Stings/Wasp Stings] (Anaphylaxis)  Ambien (Unknown)    Intolerances        LABS:                        11.8   5.93  )-----------( 200      ( 05 Jun 2025 06:19 )             33.8     06-05    139  |  102  |  10  ----------------------------<  91  4.1   |  29  |  0.92    Ca    9.3      05 Jun 2025 06:19  Phos  3.8     06-05  Mg     1.8     06-05    TPro  6.7  /  Alb  4.0  /  TBili  0.6  /  DBili  x   /  AST  104[H]  /  ALT  77[H]  /  AlkPhos  152[H]  06-05      Urinalysis Basic - ( 05 Jun 2025 06:19 )    Color: x / Appearance: x / SG: x / pH: x  Gluc: 91 mg/dL / Ketone: x  / Bili: x / Urobili: x   Blood: x / Protein: x / Nitrite: x   Leuk Esterase: x / RBC: x / WBC x   Sq Epi: x / Non Sq Epi: x / Bacteria: x      CAPILLARY BLOOD GLUCOSE          RADIOLOGY & ADDITIONAL TESTS: Reviewed.

## 2025-06-05 NOTE — CONSULT NOTE ADULT - ATTENDING COMMENTS
Biliary dilation and minor transaminitis in this lady on chronic opiates, which can cause benign cbd dilation, but also with a significant personal and family hx cancer.  We had tried to get her a spot for eus prior to discharge to assess but had no time spots.  Outpatient exam OK as per above.  thanks

## 2025-06-05 NOTE — PROGRESS NOTE ADULT - PROBLEM SELECTOR PLAN 2
Hx of Afib s/p cardiac ablation in 2022, and now on eliquis 5mg BID    - Continue Eliquis 5mg BID
: Hx of Afib s/p cardiac ablation in 2022, and now on eliquis 5mg BID    - Continue Eliquis 5mg BID.
Elevated liver enzymes AST/SGOT: 146 U/L (06.04.25 @ 16:28) ALT/SGPT: 74 U/L (06.04.25 @ 16:28). Tylenol level was normal. U/S of RUQ Abdomen on 6/5/25 shows diffuse mild intra and extrahepatic biliary dilatation, concern for   distal common bile duct obstruction.     - Pending contrast-enhanced MRI/MRCP.  - GI Consulted.

## 2025-06-05 NOTE — PROGRESS NOTE ADULT - PROBLEM SELECTOR PLAN 4
Hx of HTN    - Continue Metoprolol XL 50mg QD  - Continue Losartan 50mg QD  - Discontinued HCTZ as it was recently stopped as an outpatient.
Hx of depression, but does not currently have a psychiatrist    - Consulted psychiatry, patient refused today again . Recommendations provided to continue trazadone 50 but patient takes 150mg QHS as well as to hold wellbutrin.   - Hold Wellbutrin XL 150mg QHS started by her cardiologist a few months ago as per psychiatry recommendations  - D/C Buspar as per psychiatry recommendations  - Continue trazadone 150mg QHS  - D/C Citalopram and start sertraline 50mg QD as per psychiatrist   - D/C Ambien 10mg as per psychiatrist
Hx of depression, but does not currently have a psychiatrist    - Consulted psychiatry, patient refused today again . Recommendations provided to continue trazadone 50 but patient takes 150mg QHS as well as to hold wellbutrin while awaiting formal evaluation.   - Hold Wellbutrin XL 150mg QHS started by her cardiologist a few months ago, and was informed to hold by psychiatrist   - Continue Buspar 5mg Q12hrs  - Continue trazadone 150mg QHS  - Continue Citalopram 40mg QD
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
## 2025-06-05 NOTE — CONSULT NOTE ADULT - ASSESSMENT
64F with PMH including seizures (40yrs ago), thyroid cancer s/p thyroidectomy and parathyroidectomy, multiple orthopedic procedures including knee surgery, hip replacement and laminectomy/ACDF Vitamin D deficiency, HTN, osteoporosis, atrial fibrillation on eliquis, HTN presenting for elective EMU admission for vEEG monitoring. Pt found to have elevated ast/alt and alkphos with intra and extraheptic ductal dilatation to 11mm on US. GI consulted.     Denies prior egd  Last colonoscopy 5 years ago at OSH (3 within 1 year for polyps)    Extensive family history of cancer: Breast, pancreatic, cholangio, thyroid, colorectal cancer      Recommendations:  -labs and imaging reviewed  -Family history discussed  -Recommend outpatient genetic testing  -Recommend outpatient Hep B vaccination  -Labs improving, no rise in T.Bili, denies abd pain  -Please order CEA  -Please order Ca19-9  -Recommend inpt vs outpt EGD/EUS for evaluation of the distal CBD, biliary tree and pancreatic head.   -If patient develops fever, chills, elevated WBC, RUQ abd pain, more emergent evaluation or MRCP recommended.  -Will plan for outpatient EGD/EUS w/ one of our advanced endoscopists Dr. Almanza, Dr. Leung, or Dr. Johnson   -If patient remained inpatient, could consider inpt egd/eus 6/9-6/10     Patient seen and discussed with GI Attending on Rounds Dr. Garrett Grijalva DO, PhD  Gastroenterology Fellow  GI Consult Weekday 7am-5pm Pager: [phone]  Weeknights/Weekend/Holiday Coverage: Please Call the  for contact info    
64F with PMH including seizures (40yrs ago), thyroid cancer s/p thyroidectomy and parathyroidectomy, multiple orthopedic procedures including knee surgery, hip replacement and laminectomy/ACDF Vitamin D deficiency, HTN, osteoporosis, atrial fibrillation on eliquis, HTN presenting for elective EMU admission for vEEG monitoring.       #Seizures  - plan per primary team    #Atrial fibrillation  #HTN  - continue on home dose of eliquis 5mg BID  - continue on toprol XL 50mg daily  - continue on losartan 50mg daily  - patient reports that she no longer takes HCTZ    #Hypothyroidism  #History of parathyroidectomy  - continue levothyroxine and calcitriol    #Macrocytic anemia  - check B12 and folate levels with labs tomorrow    #Grief/Bereavement  - patient's sister passed away from pancreatic cancer in November 2024, and patient has been very upset and grieving over her passing.  Has not seen a therapist or psychiatrist, but is interested in speaking with someone in the hospital   - appreciate input from psychiatry  - Denies HI/SI    #Hypomagnesemia  - replete and repeat labs tomorrow    #Multiple prior orthopedic procedures  - confirm home pain medication regimen, and continue while inpatient  - reports that she is on both tramadol and percocet    75 minutes spent on this encounter, including face to face with patient, review of chart, care coordination and documentation.   Plan discussed with epilepsy team    Time spent on the encounter excludes teaching and separately reported services

## 2025-06-05 NOTE — PROGRESS NOTE ADULT - PROBLEM SELECTOR PLAN 6
Hx of hypothyroidism after thyroid CA s/p thyroidectomy and parathyroidectomy in 2017    - Continue Synthroid 100mcg QD.
Hx of hypothyroidism after thyroid CA s/p thyroidectomy and parathyroidectomy in 2017    - Continue Synthroid 100mcg QD
Hx of paresthesia     - Continue Gabapentin 400mg in AM and 800mg in PM.

## 2025-06-05 NOTE — PROGRESS NOTE ADULT - PROBLEM SELECTOR PLAN 8
Hx of Back, knee and shoulder pain    - Continue oxycodone 10mg Q6hrs PRN   - Continue tramadol 50mg QD PRN  - Continue flexeril 5mg in AM and 10mg in PM PRN  - Continue lidocaine patch (1 for both knees and 1 on back - 3 total).
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
## 2025-06-06 VITALS
RESPIRATION RATE: 18 BRPM | DIASTOLIC BLOOD PRESSURE: 100 MMHG | SYSTOLIC BLOOD PRESSURE: 168 MMHG | OXYGEN SATURATION: 97 %

## 2025-06-06 PROCEDURE — 80053 COMPREHEN METABOLIC PANEL: CPT

## 2025-06-06 PROCEDURE — 99233 SBSQ HOSP IP/OBS HIGH 50: CPT

## 2025-06-06 PROCEDURE — 87340 HEPATITIS B SURFACE AG IA: CPT

## 2025-06-06 PROCEDURE — 86704 HEP B CORE ANTIBODY TOTAL: CPT

## 2025-06-06 PROCEDURE — 85025 COMPLETE CBC W/AUTO DIFF WBC: CPT

## 2025-06-06 PROCEDURE — 82607 VITAMIN B-12: CPT

## 2025-06-06 PROCEDURE — 80307 DRUG TEST PRSMV CHEM ANLYZR: CPT

## 2025-06-06 PROCEDURE — 95700 EEG CONT REC W/VID EEG TECH: CPT

## 2025-06-06 PROCEDURE — 76705 ECHO EXAM OF ABDOMEN: CPT

## 2025-06-06 PROCEDURE — 99239 HOSP IP/OBS DSCHRG MGMT >30: CPT

## 2025-06-06 PROCEDURE — 97165 OT EVAL LOW COMPLEX 30 MIN: CPT

## 2025-06-06 PROCEDURE — 86709 HEPATITIS A IGM ANTIBODY: CPT

## 2025-06-06 PROCEDURE — 83735 ASSAY OF MAGNESIUM: CPT

## 2025-06-06 PROCEDURE — 85610 PROTHROMBIN TIME: CPT

## 2025-06-06 PROCEDURE — 93005 ELECTROCARDIOGRAM TRACING: CPT

## 2025-06-06 PROCEDURE — 86803 HEPATITIS C AB TEST: CPT

## 2025-06-06 PROCEDURE — 85730 THROMBOPLASTIN TIME PARTIAL: CPT

## 2025-06-06 PROCEDURE — 86705 HEP B CORE ANTIBODY IGM: CPT

## 2025-06-06 PROCEDURE — 95720 EEG PHY/QHP EA INCR W/VEEG: CPT

## 2025-06-06 PROCEDURE — 84443 ASSAY THYROID STIM HORMONE: CPT

## 2025-06-06 PROCEDURE — 86706 HEP B SURFACE ANTIBODY: CPT

## 2025-06-06 PROCEDURE — 36415 COLL VENOUS BLD VENIPUNCTURE: CPT

## 2025-06-06 PROCEDURE — 95716 VEEG EA 12-26HR CONT MNTR: CPT

## 2025-06-06 PROCEDURE — 97161 PT EVAL LOW COMPLEX 20 MIN: CPT

## 2025-06-06 PROCEDURE — 84100 ASSAY OF PHOSPHORUS: CPT

## 2025-06-06 PROCEDURE — 82306 VITAMIN D 25 HYDROXY: CPT

## 2025-06-06 PROCEDURE — 85027 COMPLETE CBC AUTOMATED: CPT

## 2025-06-06 PROCEDURE — 80048 BASIC METABOLIC PNL TOTAL CA: CPT

## 2025-06-06 RX ORDER — OXYCODONE HYDROCHLORIDE 30 MG/1
10 TABLET ORAL ONCE
Refills: 0 | Status: DISCONTINUED | OUTPATIENT
Start: 2025-06-06 | End: 2025-06-06

## 2025-06-06 RX ADMIN — Medication 40 MILLIGRAM(S): at 06:23

## 2025-06-06 RX ADMIN — LIDOCAINE HYDROCHLORIDE 1 PATCH: 20 JELLY TOPICAL at 07:17

## 2025-06-06 RX ADMIN — METOPROLOL SUCCINATE 50 MILLIGRAM(S): 50 TABLET, EXTENDED RELEASE ORAL at 11:34

## 2025-06-06 RX ADMIN — LIDOCAINE HYDROCHLORIDE 1 PATCH: 20 JELLY TOPICAL at 07:16

## 2025-06-06 RX ADMIN — GABAPENTIN 400 MILLIGRAM(S): 400 CAPSULE ORAL at 11:35

## 2025-06-06 RX ADMIN — LOSARTAN POTASSIUM 50 MILLIGRAM(S): 100 TABLET, FILM COATED ORAL at 11:34

## 2025-06-06 RX ADMIN — OXYCODONE HYDROCHLORIDE 10 MILLIGRAM(S): 30 TABLET ORAL at 08:29

## 2025-06-06 RX ADMIN — CITALOPRAM 40 MILLIGRAM(S): 20 TABLET ORAL at 11:34

## 2025-06-06 RX ADMIN — Medication 100 MICROGRAM(S): at 06:23

## 2025-06-06 RX ADMIN — CALCITRIOL 0.25 MICROGRAM(S): 0.5 CAPSULE, GELATIN COATED ORAL at 11:33

## 2025-06-06 RX ADMIN — APIXABAN 5 MILLIGRAM(S): 2.5 TABLET, FILM COATED ORAL at 11:35

## 2025-06-06 RX ADMIN — OXYCODONE HYDROCHLORIDE 10 MILLIGRAM(S): 30 TABLET ORAL at 11:32

## 2025-06-06 NOTE — PROGRESS NOTE ADULT - SUBJECTIVE AND OBJECTIVE BOX
Extensive discussion with patient at bedside, including psychiatry and gastroenterology.   She expressed that she was frustrated about endoscopy no being able to be done in the hospital.   She denies any abdominal pain.  She just wants to go home.       OVERNIGHT EVENTS: NAEO    Remaining ROS negative       PHYSICAL EXAM:    General: upset, sitting up in chair  HEENT: NC/AT; MMM  Respiratory: no respiratory distress or use of accessory muscles  Neuro: speech fluent, no facial asymmetry, no acute deficits noted    VITAL SIGNS:  Vital Signs Last 24 Hrs  T(C): 36.8 (06 Jun 2025 06:22), Max: 37.2 (05 Jun 2025 15:34)  T(F): 98.3 (06 Jun 2025 06:22), Max: 99 (05 Jun 2025 15:34)  HR: 63 (06 Jun 2025 06:22) (60 - 80)  BP: 125/80 (06 Jun 2025 06:22) (94/60 - 149/82)  BP(mean): 95 (06 Jun 2025 06:22) (95 - 95)  RR: 18 (06 Jun 2025 06:22) (18 - 18)  SpO2: 99% (06 Jun 2025 06:22) (95% - 99%)    Parameters below as of 06 Jun 2025 06:22  Patient On (Oxygen Delivery Method): room air      MEDICATIONS:  MEDICATIONS  (STANDING):  apixaban 5 milliGRAM(s) Oral <User Schedule>  calcitriol   Capsule 0.25 MICROGram(s) Oral <User Schedule>  citalopram 40 milliGRAM(s) Oral daily  gabapentin 400 milliGRAM(s) Oral <User Schedule>  gabapentin 800 milliGRAM(s) Oral <User Schedule>  levothyroxine 100 MICROGram(s) Oral daily  lidocaine   4% Patch 1 Patch Transdermal every 24 hours  lidocaine   4% Patch 1 Patch Transdermal every 24 hours  lidocaine   4% Patch 1 Patch Transdermal every 24 hours  losartan 50 milliGRAM(s) Oral <User Schedule>  metoprolol succinate ER 50 milliGRAM(s) Oral <User Schedule>  pantoprazole    Tablet 40 milliGRAM(s) Oral before breakfast  traZODone 150 milliGRAM(s) Oral at bedtime    MEDICATIONS  (PRN):  acetaminophen     Tablet .. 650 milliGRAM(s) Oral every 6 hours PRN Temp greater or equal to 38.5C (101.3F), Mild Pain (1 - 3)  cyclobenzaprine 10 milliGRAM(s) Oral at bedtime PRN Muscle Spasm  cyclobenzaprine 5 milliGRAM(s) Oral <User Schedule> PRN Muscle Spasm  diphenhydrAMINE 25 milliGRAM(s) Oral every 6 hours PRN Rash and/or Itching  LORazepam   Injectable 2 milliGRAM(s) IV Push every 2 hours PRN Seizure Activity  oxyCODONE    IR 10 milliGRAM(s) Oral every 6 hours PRN Severe Pain (7 - 10)  traMADol 50 milliGRAM(s) Oral two times a day PRN Moderate Pain (4 - 6)  zolpidem 5 milliGRAM(s) Oral at bedtime PRN Insomnia      ALLERGIES:  Allergies    Percodan (Stomach Upset)  aspirin (Stomach Upset)  Originally Entered as [Anaphylaxis] reaction to [Bee Stings/Wasp Stings] (Anaphylaxis)  Ambien (Unknown)    Intolerances        LABS:                        11.8   5.93  )-----------( 200      ( 05 Jun 2025 06:19 )             33.8     06-05    139  |  102  |  10  ----------------------------<  91  4.1   |  29  |  0.92    Ca    9.3      05 Jun 2025 06:19  Phos  3.8     06-05  Mg     1.8     06-05    TPro  6.7  /  Alb  4.0  /  TBili  0.6  /  DBili  x   /  AST  104[H]  /  ALT  77[H]  /  AlkPhos  152[H]  06-05      Urinalysis Basic - ( 05 Jun 2025 06:19 )    Color: x / Appearance: x / SG: x / pH: x  Gluc: 91 mg/dL / Ketone: x  / Bili: x / Urobili: x   Blood: x / Protein: x / Nitrite: x   Leuk Esterase: x / RBC: x / WBC x   Sq Epi: x / Non Sq Epi: x / Bacteria: x      CAPILLARY BLOOD GLUCOSE          RADIOLOGY & ADDITIONAL TESTS: Reviewed.

## 2025-06-06 NOTE — BH CONSULTATION LIAISON PROGRESS NOTE - NSBHFUPINTERVALHXFT_PSY_A_CORE
Arrived to room; pt was visibly agitated, throwing plastic utensils and clothes around the room as she packed, with limited ability to soothe herself. She stated the room was disgusting and expressed dissatisfaction with her treatment at the hospital. Pt shared feeling betrayed, saying she came to the hospital trusting it as a safe place but is now leaving feeling worse, more depressed, still in pain, and facing a potential cancer diagnosis. She reported that her family is very anxious and upset. Pt also described a family history of cancer and noted that several family members have  from it, which is frightening for her.    Pt expressed frustration regarding issues with team communication, such as not being immediately informed about the cancellation of an endoscopy and changes in psychiatric medications. Of note, the psychiatrist had previously attempted to discuss the med changes with pt, who declined to discuss further at the time. The psychiatrist had also given her the option to refuse medications. The change in question was from Celexa to Zoloft; pt noted that one of the listed side effects of Zoloft was extreme depression and remarked, “I am already depressed.”    Pt made statements such as “I’m going to join my sister,” while pointing upward in reference to davinan, and said she hoped something bad would happen to her in order to elicit guilt from the treatment team. When clarified, pt denied suicidal ideation or intent. She explained that “something bad” could refer to cancer and stated she would be okay with cancer taking her life. However, she made clear she has no explicit wish to die or intent to kill herself, and that if she did, she would not tell us. Later, pt said, “If I told you how I really feel, you’d send [facility] to the [facility].” She again emphatically denied any intent to harm herself. She made a sarcastic remark about going to a party and drinking again after a long period of sobriety, saying, “So thanks for that,” implying the team had driven her back toward drinking.    Despite her distress, pt is future-oriented. She has plans to go home, explore legal recourse regarding her treatment at the hospital, and reach out to the [employer] and the [facility] for continued advocacy. She ultimately refused further medical or psychiatric intervention, stating she didn’t want any doctor to “shove anything down [her] throat.” She made litigious comments to several members of the treatment team and requested a copy of her records. Pt was shouting at the team and was unreceptive to validation or further attempts at support; in fact, these efforts appeared to exacerbate her dysregulation. Psychiatry reassessment requested for evaluation of SI after pt made statement about jumping off a building in the context of distress about abdominal U/S findings and timing of recommended GI evaluations (inpatient v. outpatient). Per primary team, pt also observed with high emotional distress, throwing objects in room, pulling out IV and EEG leads. Pt placed on constant observation prior to evaluation by psychiatry.    Pt assessed this morning in coordination with primary team, hospitalist, GI team, and patient experience. Pt observed shouting throughout assessment, voicing unhappiness about care in hospital, upset about communication about care plan, sense of betrayal by different providers, worry about a possible cancer diagnosis. Pt voiced intention to return home and seek care at a different hospital. Pt requested copy of her medical records and names of members of care team. Pt endorsed ongoing depression. Pt declined discussion about other particular symptoms or the medication recommendations made by psychiatry. No reported SI/intent/plan, no HI.

## 2025-06-06 NOTE — BH CONSULTATION LIAISON PROGRESS NOTE - NSBHATTESTCOMMENTATTENDFT_PSY_A_CORE
Pt evaluated with psychology fellow this morning. Note edited and A/P are by writer. D/w primary team.

## 2025-06-06 NOTE — BH CONSULTATION LIAISON PROGRESS NOTE - RISK ASSESSMENT
LOW acute suicide risk, low acute violence risk. Moderate chronic risk for impulsive, aggressive behaviors due to risk factors including cluster B personality traits with impulsivity  static risk factors include age, race, h/o depression, h/o chronic pain, cluster B traits  modifiable risk factors include recent acute health issues leading to hospitalization, lack of connection to psychiatric care  protective factors include help-seeking, future oriented, commitment to meaningful volunteer work, meaningful relationships, residential stability, sobriety, no active SI/intent/plan, no HI or violent ideation, no h/o suicidality

## 2025-06-06 NOTE — BH CONSULTATION LIAISON PROGRESS NOTE - NSBHCHARTREVIEWLAB_PSY_A_CORE FT
12.0   5.06  )-----------( 187      ( 04 Jun 2025 07:15 )             36.5     06-04    143  |  103  |  11  ----------------------------<  98  4.5   |  31  |  0.95    Ca    8.9      04 Jun 2025 07:15  Phos  3.8     06-04  Mg     1.7     06-04    TPro  6.4  /  Alb  4.0  /  TBili  0.4  /  DBili  x   /  AST  104[H]  /  ALT  58[H]  /  AlkPhos  97  06-04  
                      11.8   5.93  )-----------( 200      ( 05 Jun 2025 06:19 )             33.8     06-05    139  |  102  |  10  ----------------------------<  91  4.1   |  29  |  0.92    Ca    9.3      05 Jun 2025 06:19  Phos  3.8     06-05  Mg     1.8     06-05    TPro  6.7  /  Alb  4.0  /  TBili  0.6  /  DBili  x   /  AST  104[H]  /  ALT  77[H]  /  AlkPhos  152[H]  06-05

## 2025-06-06 NOTE — BH CONSULTATION LIAISON PROGRESS NOTE - NSBHTIMEACTIVITIESPERFORMED_PSY_A_CORE
chart review, pt interview, medical decision making, coordination of care, documentation
chart review, pt interview, medical decision making, coordination of care, documentation    time spent excludes time teaching psychology fellow
No

## 2025-06-06 NOTE — PROGRESS NOTE ADULT - SUBJECTIVE AND OBJECTIVE BOX
GI Consult Progress Note:     OVERNIGHT EVENTS: ADELFO.    SUBJECTIVE / INTERVAL HPI:   Patient seen and examined at bedside. Frustrated that there is no availability to perform her EGD/EUS today, but discussed with patient at length plan for out-patient follow-up for EGD/EUS.       VITAL SIGNS:  Vital Signs Last 24 Hrs  T(C): 36.8 (06 Jun 2025 06:22), Max: 37.2 (05 Jun 2025 15:34)  T(F): 98.3 (06 Jun 2025 06:22), Max: 99 (05 Jun 2025 15:34)  HR: 63 (06 Jun 2025 06:22) (60 - 80)  BP: 125/80 (06 Jun 2025 06:22) (94/60 - 149/82)  BP(mean): 95 (06 Jun 2025 06:22) (95 - 95)  RR: 18 (06 Jun 2025 06:22) (18 - 18)  SpO2: 99% (06 Jun 2025 06:22) (95% - 99%)    Parameters below as of 06 Jun 2025 06:22  Patient On (Oxygen Delivery Method): room air      PHYSICAL EXAM:  General: Comfortable, but frustrated, thin  Lungs: Normal respiratory effort and no intercostal retractions  Cardiovascular: RRR  Neurological: Alert and oriented x3  Skin: Warm and dry. No obvious rash  Extremities: +R knee brace       MEDICATIONS:  MEDICATIONS  (STANDING):  apixaban 5 milliGRAM(s) Oral <User Schedule>  calcitriol   Capsule 0.25 MICROGram(s) Oral <User Schedule>  citalopram 40 milliGRAM(s) Oral daily  gabapentin 400 milliGRAM(s) Oral <User Schedule>  gabapentin 800 milliGRAM(s) Oral <User Schedule>  levothyroxine 100 MICROGram(s) Oral daily  lidocaine   4% Patch 1 Patch Transdermal every 24 hours  lidocaine   4% Patch 1 Patch Transdermal every 24 hours  lidocaine   4% Patch 1 Patch Transdermal every 24 hours  losartan 50 milliGRAM(s) Oral <User Schedule>  metoprolol succinate ER 50 milliGRAM(s) Oral <User Schedule>  pantoprazole    Tablet 40 milliGRAM(s) Oral before breakfast  traZODone 150 milliGRAM(s) Oral at bedtime    MEDICATIONS  (PRN):  acetaminophen     Tablet .. 650 milliGRAM(s) Oral every 6 hours PRN Temp greater or equal to 38.5C (101.3F), Mild Pain (1 - 3)  cyclobenzaprine 10 milliGRAM(s) Oral at bedtime PRN Muscle Spasm  cyclobenzaprine 5 milliGRAM(s) Oral <User Schedule> PRN Muscle Spasm  diphenhydrAMINE 25 milliGRAM(s) Oral every 6 hours PRN Rash and/or Itching  LORazepam   Injectable 2 milliGRAM(s) IV Push every 2 hours PRN Seizure Activity  oxyCODONE    IR 10 milliGRAM(s) Oral every 6 hours PRN Severe Pain (7 - 10)  traMADol 50 milliGRAM(s) Oral two times a day PRN Moderate Pain (4 - 6)  zolpidem 5 milliGRAM(s) Oral at bedtime PRN Insomnia      ALLERGIES:  Allergies    Percodan (Stomach Upset)  aspirin (Stomach Upset)  Originally Entered as [Anaphylaxis] reaction to [Bee Stings/Wasp Stings] (Anaphylaxis)  Ambien (Unknown)    Intolerances        LABS:                        11.8   5.93  )-----------( 200      ( 05 Jun 2025 06:19 )             33.8     06-05    139  |  102  |  10  ----------------------------<  91  4.1   |  29  |  0.92    Ca    9.3      05 Jun 2025 06:19  Phos  3.8     06-05  Mg     1.8     06-05    TPro  6.7  /  Alb  4.0  /  TBili  0.6  /  DBili  x   /  AST  104[H]  /  ALT  77[H]  /  AlkPhos  152[H]  06-05      Urinalysis Basic - ( 05 Jun 2025 06:19 )    Color: x / Appearance: x / SG: x / pH: x  Gluc: 91 mg/dL / Ketone: x  / Bili: x / Urobili: x   Blood: x / Protein: x / Nitrite: x   Leuk Esterase: x / RBC: x / WBC x   Sq Epi: x / Non Sq Epi: x / Bacteria: x      CAPILLARY BLOOD GLUCOSE  RADIOLOGY & ADDITIONAL TESTS: Reviewed.

## 2025-06-06 NOTE — BH CONSULTATION LIAISON PROGRESS NOTE - NSBHCHARTREVIEWVS_PSY_A_CORE FT
Vital Signs Last 24 Hrs  T(C): 36.8 (06 Jun 2025 06:22), Max: 37.2 (05 Jun 2025 15:34)  T(F): 98.3 (06 Jun 2025 06:22), Max: 99 (05 Jun 2025 15:34)  HR: 63 (06 Jun 2025 06:22) (60 - 80)  BP: 125/80 (06 Jun 2025 06:22) (94/60 - 149/82)  BP(mean): 95 (06 Jun 2025 06:22) (95 - 95)  RR: 18 (06 Jun 2025 06:22) (18 - 18)  SpO2: 99% (06 Jun 2025 06:22) (95% - 99%)    Parameters below as of 06 Jun 2025 06:22  Patient On (Oxygen Delivery Method): room air    
Vital Signs Last 24 Hrs  T(C): 37.4 (04 Jun 2025 11:23), Max: 37.4 (04 Jun 2025 11:23)  T(F): 99.4 (04 Jun 2025 11:23), Max: 99.4 (04 Jun 2025 11:23)  HR: 61 (04 Jun 2025 11:23) (55 - 67)  BP: 122/67 (04 Jun 2025 11:23) (105/64 - 136/64)  BP(mean): 102 (04 Jun 2025 05:54) (102 - 102)  RR: 18 (04 Jun 2025 11:23) (18 - 18)  SpO2: 97% (04 Jun 2025 11:23) (95% - 97%)    Parameters below as of 04 Jun 2025 11:23  Patient On (Oxygen Delivery Method): room air

## 2025-06-06 NOTE — BH CONSULTATION LIAISON PROGRESS NOTE - NSBHCHARTREVIEWINVESTIGATE_PSY_A_CORE FT
Abdominal Ultrasound 6/5  IMPRESSION:  Diffuse mild intra and extrahepatic biliary dilatation, concern for   distal common bile duct obstruction. Recommend follow-up   contrast-enhanced MRI/MRCP.  No gallstones or evidence of cholecystitis.

## 2025-06-06 NOTE — EEG REPORT - NS EEG TEXT BOX
[facility] Department of Neurology  Inpatient Epilepsy Monitoring Unit video-Electroencephalography Report    Acquisition Details:  Electroencephalography was acquired using a minimum of 21 channels on an [employer] Neu[employer] system v 9.3.1 with electrode placement according to the standard International 10-20 system following ACNS (American Clinical Neurophysiology Society) guidelines for Long-Term Video EEG monitoring.  Anterior temporal T1 and T2 electrodes were utilized whenever possible.   The XL[employer]K automated spike & seizure detections were all reviewed in detail, in addition to extensive portions of raw EEG.  Specially-trained nurses were available for seizure-related events.  Continuous live-time video monitoring of the patients for seizure-related and safety events was performed by specially-trained technicians.        Day 4:  6/5/2025, 7:00 AM to next morning at 07:00 AM , no recording from 12:16 PM to 5:20 PM  Background:  continuous, with predominantly alpha and beta frequencies.  Generalized Slowing:  None  Symmetry/Focality: No persistent asymmetries of voltage or frequency.       Voltage:  Normal (20+ uV)  Organization:  Appropriate anterior-posterior gradient  Posterior Dominant Rhythm:  10 Hz symmetric, well-organized, and well-modulated  Sleep:  Symmetric, synchronous spindles and K complexes.  Variability:   Yes		Reactivity:  Yes    Spontaneous Activity:  No epileptiform discharges     Events:  1)	At around 11:43 AM,  There was a push button for feeling dizziness with no ictal change on EEG but there was increasing heart rate and irregular rhythm on EKG    2)	Provocations:  •	Hyperventilation: was not performed.  •	Photic stimulation: was not performed.  Daily Summary:    1)	Normal awake and sleep study  2)	There were no findings of active epilepsy, however this alone does not rule out the diagnosis.       Martine Chavez MD  CNP Fellow
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
## 2025-06-06 NOTE — BH CONSULTATION LIAISON PROGRESS NOTE - CURRENT MEDICATION
MEDICATIONS  (STANDING):  apixaban 5 milliGRAM(s) Oral <User Schedule>  calcitriol   Capsule 0.25 MICROGram(s) Oral <User Schedule>  citalopram 40 milliGRAM(s) Oral daily  gabapentin 400 milliGRAM(s) Oral <User Schedule>  gabapentin 800 milliGRAM(s) Oral <User Schedule>  levothyroxine 100 MICROGram(s) Oral daily  lidocaine   4% Patch 1 Patch Transdermal every 24 hours  lidocaine   4% Patch 1 Patch Transdermal every 24 hours  lidocaine   4% Patch 1 Patch Transdermal every 24 hours  losartan 50 milliGRAM(s) Oral <User Schedule>  metoprolol succinate ER 50 milliGRAM(s) Oral <User Schedule>  pantoprazole    Tablet 40 milliGRAM(s) Oral before breakfast  traZODone 150 milliGRAM(s) Oral at bedtime    MEDICATIONS  (PRN):  acetaminophen     Tablet .. 650 milliGRAM(s) Oral every 6 hours PRN Temp greater or equal to 38.5C (101.3F), Mild Pain (1 - 3)  cyclobenzaprine 10 milliGRAM(s) Oral at bedtime PRN Muscle Spasm  cyclobenzaprine 5 milliGRAM(s) Oral <User Schedule> PRN Muscle Spasm  diphenhydrAMINE 25 milliGRAM(s) Oral every 6 hours PRN Rash and/or Itching  LORazepam   Injectable 2 milliGRAM(s) IV Push every 2 hours PRN Seizure Activity  oxyCODONE    IR 10 milliGRAM(s) Oral every 6 hours PRN Severe Pain (7 - 10)  traMADol 50 milliGRAM(s) Oral two times a day PRN Moderate Pain (4 - 6)  zolpidem 5 milliGRAM(s) Oral at bedtime PRN Insomnia  
MEDICATIONS  (STANDING):  apixaban 5 milliGRAM(s) Oral <User Schedule>  ascorbic acid 500 milliGRAM(s) Oral daily  calcitriol   Capsule 0.25 MICROGram(s) Oral <User Schedule>  gabapentin 400 milliGRAM(s) Oral <User Schedule>  gabapentin 800 milliGRAM(s) Oral <User Schedule>  ibuprofen  Tablet. 400 milliGRAM(s) Oral once  levothyroxine 100 MICROGram(s) Oral daily  lidocaine   4% Patch 1 Patch Transdermal every 24 hours  lidocaine   4% Patch 1 Patch Transdermal every 24 hours  lidocaine   4% Patch 1 Patch Transdermal every 24 hours  losartan 50 milliGRAM(s) Oral <User Schedule>  metoprolol succinate ER 50 milliGRAM(s) Oral <User Schedule>  pantoprazole    Tablet 40 milliGRAM(s) Oral before breakfast  sertraline 50 milliGRAM(s) Oral <User Schedule>  traZODone 150 milliGRAM(s) Oral at bedtime    MEDICATIONS  (PRN):  acetaminophen     Tablet .. 650 milliGRAM(s) Oral every 6 hours PRN Temp greater or equal to 38.5C (101.3F), Mild Pain (1 - 3)  cyclobenzaprine 10 milliGRAM(s) Oral at bedtime PRN Muscle Spasm  cyclobenzaprine 5 milliGRAM(s) Oral <User Schedule> PRN Muscle Spasm  LORazepam   Injectable 2 milliGRAM(s) IV Push every 2 hours PRN Seizure Activity  oxyCODONE    IR 10 milliGRAM(s) Oral every 6 hours PRN Severe Pain (7 - 10)  traMADol 50 milliGRAM(s) Oral two times a day PRN Moderate Pain (4 - 6)

## 2025-06-06 NOTE — PROGRESS NOTE ADULT - ATTENDING COMMENTS
No fever or new pain.  Pt very unhappy in that our change in plans after seeing her was not relayed by the medical team.  Long discussion held, and she has refused our assistance for the future.  I gave her my name again and explained I remain available to her if she would like, or would help facilitate care elsewhere.  thanks
Pt had no seizures or her typical event overnight, and no epileptiform activity on vEEG.  Pt concerned about medication orders here differing from her home doses and feeling communication of such has not been up to par.    She requires ambulation for events to occur, so hopeful to be up with PT and/or with nursing etc.    If she is agreeable to stay, will ask for ambulation assistance to trigger a typical event.  If related to hypoperfusion, will see characteristic findings on EEG, vs ictal patterns.

## 2025-06-06 NOTE — BH CONSULTATION LIAISON PROGRESS NOTE - NSBHATTESTBILLING_PSY_A_CORE
21023-Phgpvghyoz OBS or IP - moderate complexity OR 35-49 mins
60196-Csucyrvgdq OBS or IP - high complexity OR 50-79 mins

## 2025-06-06 NOTE — BH CONSULTATION LIAISON PROGRESS NOTE - NSBHCONSULTFOLLOWAFTERCARE_PSY_A_CORE FT
Encourage to establish OP psychiatric care for med management and psychotherapy. Referral options provided in 6/3 consult note.  Note that pt currently declines referral.
Encourage to establish OP psychiatric care for med management and psychotherapy. Referral options provided in 6/3 consult note.  Note that pt currently declines referral.

## 2025-06-06 NOTE — PROGRESS NOTE ADULT - ASSESSMENT
64F with PMH including seizures (40yrs ago), thyroid cancer s/p thyroidectomy and parathyroidectomy, multiple orthopedic procedures including knee surgery, hip replacement and laminectomy/ACDF Vitamin D deficiency, HTN, osteoporosis, atrial fibrillation on eliquis, HTN presenting for elective EMU admission for vEEG monitoring.       #Seizures  - plan per primary team    #Atrial fibrillation  #HTN  - continue on home dose of eliquis 5mg BID  - continue on toprol XL 50mg daily  - continue on losartan 50mg daily  - patient confirmed that she was on HCTZ, but is frustrated about the number of medications that she is on.  At this time, it is reasonable to discontinue HCTZ and she can follow up with her cardiologist for further BP med titration.  BPs have been in an acceptable range/normotension without it.    - she developed lower extremity edema with amlodipine and discontinued    #Hypothyroidism  #History of parathyroidectomy  - continue levothyroxine and calcitriol    #Macrocytic anemia  - check B12 and folate levels with labs tomorrow    #Grief/Bereavement  - patient's sister passed away from pancreatic cancer in November 2024, and patient has been very upset and grieving over her passing.  Has not seen a therapist or psychiatrist, but is interested in speaking with someone in the hospital   - appreciate input from psychiatry - medication adjustments per psychiatry, they recommended stopping celexa, as per their documentation, high dose for elderly patient and instead starting sertraline to treat anxiety/depression as well as reduce risk of side effects   - Denies HI/SI    #Hypomagnesemia  - replete prn    #Multiple prior orthopedic procedures  - confirm home pain medication regimen, and continue while inpatient  - reports that she is on both tramadol and percocet  - as she experiences chronic pain, would greatly benefit from being referred to a pain management specialist to help her simplify her pain medication regimen.  Per my review of [facility], both her primary care and orthopedic surgeon have been prescribing narcotics.      #Transaminitis  - no abdominal pain  - repeat LFTs this afternoon., check tylenol level and Hep panel  - if still elevated, recommend RUQ ultrasound to evaluate for any pathology      50 minutes spent on this encounter, including face to face with patient, review of chart, care coordination and documentation.   Plan discussed with epilepsy team    Time spent on the encounter excludes teaching and separately reported services
64F with PMH including seizures (40yrs ago), thyroid cancer s/p thyroidectomy and parathyroidectomy, multiple orthopedic procedures including knee surgery, hip replacement and laminectomy/ACDF Vitamin D deficiency, HTN, osteoporosis, atrial fibrillation on eliquis, HTN presenting for elective EMU admission for vEEG monitoring. Pt found to have elevated ast/alt and alkphos with intra and extraheptic ductal dilatation to 11mm on US. GI consulted.     Denies prior egd  Last colonoscopy 5 years ago at [facility] (3 within 1 year for polyps)  Extensive family history of cancer: Breast, pancreatic, cholangio, thyroid, colorectal cancer    Recommendations:  - planned for EGD/EUS ideally today to evaluate distal CBD, biliary tree, and pancreatic head but no availability on endoscopy schedule   - will arrange out-patient follow-up with advanced [facility] (Dr. Almanza or Dr. Leung) for out-patient EGD/EUS  - recommend out-patient genetic testing and Hep B vaccination   - send CEA and Ca 19-9 prior to discharge     Case discussed with Dr. Tucker. GI Team will continue to follow.     Ritu Mcdonough D.O.   Gastroenterology Fellow  Weekday 7am-5pm Pager: [phone]  Weeknights/Weekend/Holiday Coverage: Please call the  for contact info  
64F with PMH including seizures (40yrs ago), thyroid cancer s/p thyroidectomy and parathyroidectomy, multiple orthopedic procedures including knee surgery, hip replacement and laminectomy/ACDF Vitamin D deficiency, HTN, osteoporosis, atrial fibrillation on eliquis, HTN presenting for elective EMU admission for vEEG monitoring.       #Seizures  - plan per primary team    #Atrial fibrillation  #HTN  - continue on home dose of eliquis 5mg BID  - continue on toprol XL 50mg daily  - continue on losartan 50mg daily  - patient confirmed that she was on HCTZ, but is frustrated about the number of medications that she is on.  At this time, it is reasonable to discontinue HCTZ and she can follow up with her cardiologist for further BP med titration.  BPs have been in an acceptable range/normotension without it.    - she developed lower extremity edema with amlodipine and discontinued    #Hypothyroidism  #History of parathyroidectomy  - continue levothyroxine and calcitriol    #Macrocytic anemia  - b12 levels normal     #Grief/Bereavement  - patient's sister passed away from pancreatic cancer in November 2024, and patient has been very upset and grieving over her passing.  Has not seen a therapist or psychiatrist, but is interested in speaking with someone in the hospital   - appreciate input from psychiatry - medication adjustments per psychiatry, they recommended stopping celexa, as per their documentation, high dose for elderly patient and instead starting sertraline to treat anxiety/depression as well as reduce risk of side effects     #Hypomagnesemia  - replete prn    #Multiple prior orthopedic procedures  - confirm home pain medication regimen, and continue while inpatient  - reports that she is on both tramadol and percocet  - as she experiences chronic pain, would greatly benefit from being referred to a pain management specialist to help her simplify her pain medication regimen.  Per my review of [facility], both her primary care and orthopedic surgeon have been prescribing narcotics.      #Transaminitis  - no acute hepatitis on labs  - the only new medication that she took was one dose of benadryl, which is not typically associated with LFT derangements  - RUQ showing dilated intra and extrahepatic ducts, with concern for distal CBD dilatation.  GI consult appreciated.  They evaluated patient, and recommend follow up with EGD/EUS with GI that can be done outpatient.    - tylenol level negative  - hep B surface antibody negative - indicating no HBV vaccination     #Incidental R renal cyst seen in RUQ ultrasound  - outpatient follow up      50 minutes spent on this encounter, including face to face with patient, review of chart, care coordination and documentation.   Plan discussed with epilepsy team    Time spent on the encounter excludes teaching and separately reported services
64F with PMH including seizures (40yrs ago), thyroid cancer s/p thyroidectomy and parathyroidectomy, multiple orthopedic procedures including knee surgery, hip replacement and laminectomy/ACDF Vitamin D deficiency, HTN, osteoporosis, atrial fibrillation on eliquis, HTN presenting for elective EMU admission for vEEG monitoring.       #Seizures  - plan per primary team    #Atrial fibrillation  #HTN  - continue on home dose of eliquis 5mg BID  - continue on toprol XL 50mg daily  - continue on losartan 50mg daily  - patient confirmed that she was on HCTZ, but is frustrated about the number of medications that she is on.  At this time, it is reasonable to discontinue HCTZ and she can follow up with her cardiologist for further BP med titration.  BPs have been in an acceptable range/normotension without it.    - she developed lower extremity edema with amlodipine and discontinued    #Hypothyroidism  #History of parathyroidectomy  - continue levothyroxine and calcitriol    #Macrocytic anemia  - b12 levels normal     #Grief/Bereavement  - patient's sister passed away from pancreatic cancer in November 2024, and patient has been very upset and grieving over her passing.  Has not seen a therapist or psychiatrist, but is interested in speaking with someone in the hospital   - appreciate input from psychiatry - medication adjustments per psychiatry, they recommended stopping celexa, as per their documentation, high dose for elderly patient and instead starting sertraline to treat anxiety/depression as well as reduce risk of side effects   - Denies HI/SI    #Hypomagnesemia  - replete prn    #Multiple prior orthopedic procedures  - confirm home pain medication regimen, and continue while inpatient  - reports that she is on both tramadol and percocet  - as she experiences chronic pain, would greatly benefit from being referred to a pain management specialist to help her simplify her pain medication regimen.  Per my review of [facility], both her primary care and orthopedic surgeon have been prescribing narcotics.      #Transaminitis  - no abdominal pain  - AST increased again  - no acute hepatitis on labs  - the only new medication that she took was one dose of benadryl, which is not typically associated with LFT derangements  - RUQ pending prior to discharge  - recommend that she follow up outpatient  - tylenol level negative  - hep B surface antibody negative - indicating no HBV vaccination       50 minutes spent on this encounter, including face to face with patient, review of chart, care coordination and documentation.   Plan discussed with epilepsy team    Time spent on the encounter excludes teaching and separately reported services
64 year-old-right-handed-female w/ PMH of remote seizures (40yrs ago), vasovagal syncope, Thyroid CA s/p parathyroidectomy and thyroidectomy 2017 and subsequent hypothyroidism, OA, tremor, vitamin D deficiency, HTN, spinal stenosis, osteoporosis, anxious, Afib s/p cardiac ablation 2022, B/L knee surgery, L shoulder replacement, breast lumpectomy, B/L hip replacements, ACDF C4 - 5, C5 - 6, C6 - 7 and lumbar laminectomy L3-4 and L4 - 5 who presents for an elective admission to characterize events that started a year ago while undergoing vEEG monitoring.
64 year-old-right-handed-female w/ PMH of remote seizures (40yrs ago), vasovagal syncope, Thyroid CA s/p parathyroidectomy and thyroidectomy 2017 and subsequent hypothyroidism, OA, tremor, vitamin D deficiency, HTN, spinal stenosis, osteoporosis, anxious, Afib s/p cardiac ablation 2022, B/L knee surgery, L shoulder replacement, breast lumpectomy, B/L hip replacements, ACDF C4 - 5, C5 - 6, C6 - 7 and lumbar laminectomy L3-4 and L4 - 5 who was admitted electively on 6/2/25 to characterize events that started a year ago while undergoing vEEG monitoring. 
64 year-old-right-handed-female w/ PMH of remote seizures (40yrs ago), vasovagal syncope, Thyroid CA s/p parathyroidectomy and thyroidectomy 2017 and subsequent hypothyroidism, OA, tremor, vitamin D deficiency, HTN, spinal stenosis, osteoporosis, anxious, Afib s/p cardiac ablation 2022, B/L knee surgery, L shoulder replacement, breast lumpectomy, B/L hip replacements, ACDF C4 - 5, C5 - 6, C6 - 7 and lumbar laminectomy L3-4 and L4 - 5 who was admitted electively on 6/2/25 to characterize events that started a year ago while undergoing vEEG monitoring. vEEG from 6/2-6/5 was normal, no epileptiform discharges or seizures captured. U/S of RUQ Abdomen on 6/5/25 shows diffuse mild intra and extrahepatic biliary dilatation, concern for distal common bile duct obstruction,  concern for distal common bile duct obstruction.

## 2025-06-06 NOTE — BH CONSULTATION LIAISON PROGRESS NOTE - OTHER
not formally assessed sitting up in chair on 7WO themes of perceived abandonment, interpersonal mistrust, litigious content not formally assessed. recalled writer from prior evaluations

## 2025-06-10 ENCOUNTER — TRANSCRIPTION ENCOUNTER (OUTPATIENT)
Age: 65
End: 2025-06-10

## 2025-06-18 DIAGNOSIS — M54.9 DORSALGIA, UNSPECIFIED: ICD-10-CM

## 2025-06-18 DIAGNOSIS — I10 ESSENTIAL (PRIMARY) HYPERTENSION: ICD-10-CM

## 2025-06-18 DIAGNOSIS — R55 SYNCOPE AND COLLAPSE: ICD-10-CM

## 2025-06-18 DIAGNOSIS — I48.91 UNSPECIFIED ATRIAL FIBRILLATION: ICD-10-CM

## 2025-06-18 DIAGNOSIS — D51.9 VITAMIN B12 DEFICIENCY ANEMIA, UNSPECIFIED: ICD-10-CM

## 2025-06-18 DIAGNOSIS — Z95.818 PRESENCE OF OTHER CARDIAC IMPLANTS AND GRAFTS: ICD-10-CM

## 2025-06-18 DIAGNOSIS — N28.1 CYST OF KIDNEY, ACQUIRED: ICD-10-CM

## 2025-06-18 DIAGNOSIS — R74.01 ELEVATION OF LEVELS OF LIVER TRANSAMINASE LEVELS: ICD-10-CM

## 2025-06-18 DIAGNOSIS — F43.21 ADJUSTMENT DISORDER WITH DEPRESSED MOOD: ICD-10-CM

## 2025-06-18 DIAGNOSIS — E03.9 HYPOTHYROIDISM, UNSPECIFIED: ICD-10-CM

## 2025-06-18 DIAGNOSIS — R74.8 ABNORMAL LEVELS OF OTHER SERUM ENZYMES: ICD-10-CM

## 2025-06-18 DIAGNOSIS — G40.909 EPILEPSY, UNSPECIFIED, NOT INTRACTABLE, WITHOUT STATUS EPILEPTICUS: ICD-10-CM

## 2025-06-18 DIAGNOSIS — Z79.01 LONG TERM (CURRENT) USE OF ANTICOAGULANTS: ICD-10-CM

## 2025-06-18 DIAGNOSIS — R20.2 PARESTHESIA OF SKIN: ICD-10-CM

## 2025-06-18 DIAGNOSIS — R26.0 ATAXIC GAIT: ICD-10-CM

## 2025-06-18 DIAGNOSIS — Z87.820 PERSONAL HISTORY OF TRAUMATIC BRAIN INJURY: ICD-10-CM

## 2025-06-18 DIAGNOSIS — M81.0 AGE-RELATED OSTEOPOROSIS WITHOUT CURRENT PATHOLOGICAL FRACTURE: ICD-10-CM

## 2025-06-18 DIAGNOSIS — M25.519 PAIN IN UNSPECIFIED SHOULDER: ICD-10-CM

## 2025-06-18 DIAGNOSIS — M48.00 SPINAL STENOSIS, SITE UNSPECIFIED: ICD-10-CM

## 2025-06-18 DIAGNOSIS — Z96.653 PRESENCE OF ARTIFICIAL KNEE JOINT, BILATERAL: ICD-10-CM

## 2025-06-18 DIAGNOSIS — G47.00 INSOMNIA, UNSPECIFIED: ICD-10-CM

## 2025-06-18 DIAGNOSIS — M17.12 UNILATERAL PRIMARY OSTEOARTHRITIS, LEFT KNEE: ICD-10-CM

## 2025-06-18 DIAGNOSIS — Z79.890 HORMONE REPLACEMENT THERAPY: ICD-10-CM

## 2025-06-18 DIAGNOSIS — E55.9 VITAMIN D DEFICIENCY, UNSPECIFIED: ICD-10-CM

## 2025-06-18 DIAGNOSIS — K83.1 OBSTRUCTION OF BILE DUCT: ICD-10-CM

## 2025-06-18 DIAGNOSIS — M25.569 PAIN IN UNSPECIFIED KNEE: ICD-10-CM

## 2025-06-18 DIAGNOSIS — Z96.612 PRESENCE OF LEFT ARTIFICIAL SHOULDER JOINT: ICD-10-CM

## 2025-06-18 DIAGNOSIS — F32.9 MAJOR DEPRESSIVE DISORDER, SINGLE EPISODE, UNSPECIFIED: ICD-10-CM

## 2025-06-18 DIAGNOSIS — Z87.898 PERSONAL HISTORY OF OTHER SPECIFIED CONDITIONS: ICD-10-CM

## 2025-06-18 DIAGNOSIS — E83.42 HYPOMAGNESEMIA: ICD-10-CM

## 2025-06-18 DIAGNOSIS — G89.29 OTHER CHRONIC PAIN: ICD-10-CM

## 2025-07-02 ENCOUNTER — APPOINTMENT (OUTPATIENT)
Dept: NEUROLOGY | Facility: CLINIC | Age: 65
End: 2025-07-02

## 2025-07-02 PROBLEM — E03.9 HYPOTHYROIDISM, UNSPECIFIED: Chronic | Status: ACTIVE | Noted: 2025-06-02

## 2025-07-02 PROBLEM — Z86.69 PERSONAL HISTORY OF OTHER DISEASES OF THE NERVOUS SYSTEM AND SENSE ORGANS: Chronic | Status: ACTIVE | Noted: 2025-06-02

## 2025-07-02 PROBLEM — I48.91 UNSPECIFIED ATRIAL FIBRILLATION: Chronic | Status: ACTIVE | Noted: 2025-06-02

## 2025-07-02 PROBLEM — I10 ESSENTIAL (PRIMARY) HYPERTENSION: Chronic | Status: ACTIVE | Noted: 2025-06-02

## 2025-07-02 PROBLEM — E55.9 VITAMIN D DEFICIENCY, UNSPECIFIED: Chronic | Status: ACTIVE | Noted: 2025-06-02

## 2025-07-09 ENCOUNTER — APPOINTMENT (OUTPATIENT)
Dept: GASTROENTEROLOGY | Facility: CLINIC | Age: 65
End: 2025-07-09
Payer: MEDICARE

## 2025-07-09 VITALS
OXYGEN SATURATION: 98 % | TEMPERATURE: 97.5 F | DIASTOLIC BLOOD PRESSURE: 78 MMHG | WEIGHT: 163 LBS | HEART RATE: 55 BPM | SYSTOLIC BLOOD PRESSURE: 145 MMHG | HEIGHT: 67 IN | BODY MASS INDEX: 25.58 KG/M2

## 2025-07-09 PROBLEM — R74.8 ELEVATED LIVER ENZYMES: Status: ACTIVE | Noted: 2025-07-09

## 2025-07-09 PROBLEM — Z80.0 FAMILY HISTORY OF PANCREATIC CANCER: Status: ACTIVE | Noted: 2025-07-09

## 2025-07-09 PROBLEM — Z12.11 ENCOUNTER FOR COLONOSCOPY IN PATIENT WITH FAMILY HISTORY OF COLON CANCER: Status: ACTIVE | Noted: 2025-07-09 | Resolved: 2025-07-23

## 2025-07-09 PROBLEM — K83.8 DILATED CBD, ACQUIRED: Status: ACTIVE | Noted: 2025-07-09

## 2025-07-09 PROCEDURE — G2211 COMPLEX E/M VISIT ADD ON: CPT

## 2025-07-09 PROCEDURE — 99204 OFFICE O/P NEW MOD 45 MIN: CPT

## 2025-07-09 RX ORDER — POLYETHYLENE GLYCOL 3350 AND ELECTROLYTES WITH LEMON FLAVOR 236; 22.74; 6.74; 5.86; 2.97 G/4L; G/4L; G/4L; G/4L; G/4L
236 POWDER, FOR SOLUTION ORAL
Qty: 1 | Refills: 0 | Status: ACTIVE | COMMUNITY
Start: 2025-07-09 | End: 1900-01-01

## 2025-07-11 LAB
ALBUMIN SERPL ELPH-MCNC: 4.4 G/DL
ALP BLD-CCNC: 81 U/L
ALT SERPL-CCNC: 18 U/L
AST SERPL-CCNC: 24 U/L
BILIRUB DIRECT SERPL-MCNC: 0.16 MG/DL
BILIRUB INDIRECT SERPL-MCNC: 0.3 MG/DL
BILIRUB SERPL-MCNC: 0.4 MG/DL
CANCER AG19-9 SERPL-ACNC: 9 U/ML
CEA SERPL-MCNC: 4.8 NG/ML
HCT VFR BLD CALC: 34.2 %
HGB BLD-MCNC: 11.2 G/DL
MCHC RBC-ENTMCNC: 32.7 G/DL
MCHC RBC-ENTMCNC: 33.1 PG
MCV RBC AUTO: 101.2 FL
PLATELET # BLD AUTO: 239 K/UL
PROT SERPL-MCNC: 7 G/DL
RBC # BLD: 3.38 M/UL
RBC # FLD: 12.5 %
WBC # FLD AUTO: 8.72 K/UL

## 2025-07-14 LAB
PETH 16:0/18:1: NEGATIVE NG/ML
PETH 16:0/18:2: NEGATIVE NG/ML
PETH COMMENTS: NORMAL

## 2025-07-22 ENCOUNTER — TRANSCRIPTION ENCOUNTER (OUTPATIENT)
Age: 65
End: 2025-07-22

## 2025-07-25 ENCOUNTER — NON-APPOINTMENT (OUTPATIENT)
Age: 65
End: 2025-07-25

## 2025-07-25 ENCOUNTER — APPOINTMENT (OUTPATIENT)
Dept: ENDOCRINOLOGY | Facility: CLINIC | Age: 65
End: 2025-07-25

## 2025-07-31 ENCOUNTER — OUTPATIENT (OUTPATIENT)
Dept: OUTPATIENT SERVICES | Facility: HOSPITAL | Age: 65
LOS: 1 days | Discharge: HOME | End: 2025-07-31
Payer: MEDICARE

## 2025-07-31 ENCOUNTER — TRANSCRIPTION ENCOUNTER (OUTPATIENT)
Age: 65
End: 2025-07-31

## 2025-07-31 ENCOUNTER — APPOINTMENT (OUTPATIENT)
Dept: GASTROENTEROLOGY | Facility: HOSPITAL | Age: 65
End: 2025-07-31

## 2025-07-31 ENCOUNTER — RESULT REVIEW (OUTPATIENT)
Age: 65
End: 2025-07-31

## 2025-07-31 DIAGNOSIS — Z98.890 OTHER SPECIFIED POSTPROCEDURAL STATES: Chronic | ICD-10-CM

## 2025-07-31 DIAGNOSIS — Z96.642 PRESENCE OF LEFT ARTIFICIAL HIP JOINT: Chronic | ICD-10-CM

## 2025-07-31 PROCEDURE — 45380 COLONOSCOPY AND BIOPSY: CPT | Mod: GC,59

## 2025-07-31 PROCEDURE — 88342 IMHCHEM/IMCYTCHM 1ST ANTB: CPT

## 2025-07-31 PROCEDURE — 43239 EGD BIOPSY SINGLE/MULTIPLE: CPT

## 2025-07-31 PROCEDURE — 88305 TISSUE EXAM BY PATHOLOGIST: CPT

## 2025-07-31 PROCEDURE — 43259 EGD US EXAM DUODENUM/JEJUNUM: CPT

## 2025-07-31 PROCEDURE — 45385 COLONOSCOPY W/LESION REMOVAL: CPT

## 2025-07-31 PROCEDURE — 88342 IMHCHEM/IMCYTCHM 1ST ANTB: CPT | Mod: 26

## 2025-07-31 PROCEDURE — 45380 COLONOSCOPY AND BIOPSY: CPT | Mod: XS

## 2025-07-31 PROCEDURE — 43259 EGD US EXAM DUODENUM/JEJUNUM: CPT | Mod: GC

## 2025-07-31 PROCEDURE — 45385 COLONOSCOPY W/LESION REMOVAL: CPT | Mod: GC

## 2025-07-31 PROCEDURE — 43239 EGD BIOPSY SINGLE/MULTIPLE: CPT | Mod: GC,59

## 2025-07-31 PROCEDURE — 88305 TISSUE EXAM BY PATHOLOGIST: CPT | Mod: 26

## 2025-08-01 ENCOUNTER — NON-APPOINTMENT (OUTPATIENT)
Age: 65
End: 2025-08-01

## 2025-08-04 ENCOUNTER — NON-APPOINTMENT (OUTPATIENT)
Age: 65
End: 2025-08-04

## 2025-08-12 ENCOUNTER — APPOINTMENT (OUTPATIENT)
Dept: ENDOCRINOLOGY | Facility: CLINIC | Age: 65
End: 2025-08-12
Payer: MEDICARE

## 2025-08-12 VITALS
BODY MASS INDEX: 25.69 KG/M2 | WEIGHT: 164 LBS | SYSTOLIC BLOOD PRESSURE: 131 MMHG | HEART RATE: 54 BPM | DIASTOLIC BLOOD PRESSURE: 77 MMHG

## 2025-08-12 DIAGNOSIS — E89.2 POSTPROCEDURAL HYPOPARATHYROIDISM: ICD-10-CM

## 2025-08-12 DIAGNOSIS — E03.9 HYPOTHYROIDISM, UNSPECIFIED: ICD-10-CM

## 2025-08-12 DIAGNOSIS — E66.3 OVERWEIGHT: ICD-10-CM

## 2025-08-12 DIAGNOSIS — E55.9 VITAMIN D DEFICIENCY, UNSPECIFIED: ICD-10-CM

## 2025-08-12 PROCEDURE — 99215 OFFICE O/P EST HI 40 MIN: CPT

## 2025-08-12 PROCEDURE — G2211 COMPLEX E/M VISIT ADD ON: CPT

## 2025-08-12 RX ORDER — SEMAGLUTIDE 0.68 MG/ML
2 INJECTION, SOLUTION SUBCUTANEOUS
Qty: 1 | Refills: 0 | Status: ACTIVE | COMMUNITY
Start: 2025-08-12 | End: 1900-01-01

## 2025-08-13 ENCOUNTER — NON-APPOINTMENT (OUTPATIENT)
Age: 65
End: 2025-08-13

## 2025-08-18 ENCOUNTER — NON-APPOINTMENT (OUTPATIENT)
Age: 65
End: 2025-08-18

## 2025-08-19 ENCOUNTER — TRANSCRIPTION ENCOUNTER (OUTPATIENT)
Age: 65
End: 2025-08-19

## 2025-08-22 ENCOUNTER — NON-APPOINTMENT (OUTPATIENT)
Age: 65
End: 2025-08-22

## 2025-08-22 ENCOUNTER — APPOINTMENT (OUTPATIENT)
Dept: OPHTHALMOLOGY | Facility: CLINIC | Age: 65
End: 2025-08-22
Payer: MEDICARE

## 2025-08-22 PROCEDURE — 92014 COMPRE OPH EXAM EST PT 1/>: CPT

## 2025-08-26 ENCOUNTER — APPOINTMENT (OUTPATIENT)
Dept: GASTROENTEROLOGY | Facility: CLINIC | Age: 65
End: 2025-08-26
Payer: MEDICARE

## 2025-08-26 VITALS
WEIGHT: 165 LBS | TEMPERATURE: 97.1 F | DIASTOLIC BLOOD PRESSURE: 79 MMHG | HEART RATE: 56 BPM | BODY MASS INDEX: 25.9 KG/M2 | SYSTOLIC BLOOD PRESSURE: 138 MMHG | OXYGEN SATURATION: 98 % | HEIGHT: 67 IN

## 2025-08-26 DIAGNOSIS — D36.9 BENIGN NEOPLASM, UNSPECIFIED SITE: ICD-10-CM

## 2025-08-26 DIAGNOSIS — R19.7 DIARRHEA, UNSPECIFIED: ICD-10-CM

## 2025-08-26 DIAGNOSIS — K83.8 OTHER SPECIFIED DISEASES OF BILIARY TRACT: ICD-10-CM

## 2025-08-26 DIAGNOSIS — R19.5 OTHER FECAL ABNORMALITIES: ICD-10-CM

## 2025-08-26 DIAGNOSIS — R55 SYNCOPE AND COLLAPSE: ICD-10-CM

## 2025-08-26 DIAGNOSIS — K59.00 CONSTIPATION, UNSPECIFIED: ICD-10-CM

## 2025-08-26 PROCEDURE — G2211 COMPLEX E/M VISIT ADD ON: CPT

## 2025-08-26 PROCEDURE — 99214 OFFICE O/P EST MOD 30 MIN: CPT

## 2025-08-27 DIAGNOSIS — F32.A DEPRESSION, UNSPECIFIED: ICD-10-CM

## 2025-08-27 LAB
BASOPHILS # BLD AUTO: 0.04 K/UL
BASOPHILS NFR BLD AUTO: 0.4 %
EOSINOPHIL # BLD AUTO: 0.22 K/UL
EOSINOPHIL NFR BLD AUTO: 2.2 %
HCT VFR BLD CALC: 36.9 %
HGB BLD-MCNC: 11.8 G/DL
IMM GRANULOCYTES NFR BLD AUTO: 0.4 %
LYMPHOCYTES # BLD AUTO: 2.5 K/UL
LYMPHOCYTES NFR BLD AUTO: 25.3 %
MAN DIFF?: NORMAL
MCHC RBC-ENTMCNC: 32 G/DL
MCHC RBC-ENTMCNC: 32.3 PG
MCV RBC AUTO: 101.1 FL
MONOCYTES # BLD AUTO: 0.69 K/UL
MONOCYTES NFR BLD AUTO: 7 %
NEUTROPHILS # BLD AUTO: 6.41 K/UL
NEUTROPHILS NFR BLD AUTO: 64.7 %
PLATELET # BLD AUTO: 258 K/UL
RBC # BLD: 3.65 M/UL
RBC # FLD: 12.7 %
WBC # FLD AUTO: 9.9 K/UL

## 2025-08-28 ENCOUNTER — APPOINTMENT (OUTPATIENT)
Dept: MRI IMAGING | Facility: CLINIC | Age: 65
End: 2025-08-28

## 2025-08-28 PROCEDURE — A9585: CPT

## 2025-08-28 PROCEDURE — 74183 MRI ABD W/O CNTR FLWD CNTR: CPT

## 2025-09-04 ENCOUNTER — NON-APPOINTMENT (OUTPATIENT)
Age: 65
End: 2025-09-04

## (undated) DEVICE — FORCEP RADIAL JAW 4 240CM DISP

## (undated) DEVICE — SNARE CAPTIVATOR COLD RND STIFF 10X2.4X2.8MM 240CM